# Patient Record
Sex: MALE | Race: WHITE | Employment: PART TIME | URBAN - METROPOLITAN AREA
[De-identification: names, ages, dates, MRNs, and addresses within clinical notes are randomized per-mention and may not be internally consistent; named-entity substitution may affect disease eponyms.]

---

## 2021-04-01 ENCOUNTER — IMMUNIZATION (OUTPATIENT)
Dept: FAMILY MEDICINE CLINIC | Age: 36
End: 2021-04-01
Payer: MEDICARE

## 2021-04-01 PROCEDURE — 91300 COVID-19, PFIZER VACCINE 30MCG/0.3ML DOSE: CPT | Performed by: FAMILY MEDICINE

## 2021-04-01 PROCEDURE — 0001A COVID-19, PFIZER VACCINE 30MCG/0.3ML DOSE: CPT | Performed by: FAMILY MEDICINE

## 2021-04-22 ENCOUNTER — IMMUNIZATION (OUTPATIENT)
Dept: FAMILY MEDICINE CLINIC | Age: 36
End: 2021-04-22
Payer: MEDICARE

## 2021-04-22 PROCEDURE — 0002A COVID-19, PFIZER VACCINE 30MCG/0.3ML DOSE: CPT | Performed by: FAMILY MEDICINE

## 2021-04-22 PROCEDURE — 91300 COVID-19, PFIZER VACCINE 30MCG/0.3ML DOSE: CPT | Performed by: FAMILY MEDICINE

## 2022-01-18 ENCOUNTER — HOSPITAL ENCOUNTER (EMERGENCY)
Age: 37
Discharge: HOME | End: 2022-01-18
Attending: EMERGENCY MEDICINE
Payer: MEDICARE

## 2022-01-18 ENCOUNTER — APPOINTMENT (OUTPATIENT)
Dept: GENERAL RADIOLOGY | Age: 37
End: 2022-01-18
Payer: MEDICARE

## 2022-01-18 VITALS
HEIGHT: 70 IN | RESPIRATION RATE: 19 BRPM | OXYGEN SATURATION: 96 % | HEART RATE: 95 BPM | TEMPERATURE: 97.9 F | SYSTOLIC BLOOD PRESSURE: 129 MMHG | DIASTOLIC BLOOD PRESSURE: 72 MMHG

## 2022-01-18 DIAGNOSIS — S82.891A CLOSED FRACTURE OF RIGHT ANKLE, INITIAL ENCOUNTER: Primary | ICD-10-CM

## 2022-01-18 PROCEDURE — 29515 APPLICATION SHORT LEG SPLINT: CPT

## 2022-01-18 PROCEDURE — 99283 EMERGENCY DEPT VISIT LOW MDM: CPT

## 2022-01-18 PROCEDURE — 73610 X-RAY EXAM OF ANKLE: CPT

## 2022-01-18 PROCEDURE — 6370000000 HC RX 637 (ALT 250 FOR IP): Performed by: EMERGENCY MEDICINE

## 2022-01-18 RX ORDER — HYDROCODONE BITARTRATE AND ACETAMINOPHEN 5; 325 MG/1; MG/1
1 TABLET ORAL EVERY 4 HOURS PRN
Qty: 18 TABLET | Refills: 0 | Status: SHIPPED | OUTPATIENT
Start: 2022-01-18 | End: 2022-01-21

## 2022-01-18 RX ORDER — ACETAMINOPHEN 500 MG
1000 TABLET ORAL ONCE
Status: COMPLETED | OUTPATIENT
Start: 2022-01-18 | End: 2022-01-18

## 2022-01-18 RX ADMIN — ACETAMINOPHEN 1000 MG: 500 TABLET ORAL at 01:15

## 2022-01-18 ASSESSMENT — PAIN DESCRIPTION - PAIN TYPE: TYPE: ACUTE PAIN

## 2022-01-18 ASSESSMENT — PAIN DESCRIPTION - LOCATION: LOCATION: ANKLE

## 2022-01-18 ASSESSMENT — PAIN DESCRIPTION - PROGRESSION: CLINICAL_PROGRESSION: GRADUALLY WORSENING

## 2022-01-18 ASSESSMENT — PAIN SCALES - GENERAL
PAINLEVEL_OUTOF10: 10
PAINLEVEL_OUTOF10: 10

## 2022-01-18 ASSESSMENT — PAIN DESCRIPTION - DESCRIPTORS: DESCRIPTORS: THROBBING

## 2022-01-18 ASSESSMENT — PAIN DESCRIPTION - FREQUENCY: FREQUENCY: CONTINUOUS

## 2022-01-18 ASSESSMENT — PAIN - FUNCTIONAL ASSESSMENT: PAIN_FUNCTIONAL_ASSESSMENT: PREVENTS OR INTERFERES SOME ACTIVE ACTIVITIES AND ADLS

## 2022-01-18 ASSESSMENT — PAIN DESCRIPTION - ORIENTATION: ORIENTATION: RIGHT

## 2022-01-18 NOTE — Clinical Note
Elizabet Wood was seen and treated in our emergency department on 1/18/2022. He may return to work on 01/24/2022. Restrictions as directed by the orthopedic doctor. If you have any questions or concerns, please don't hesitate to call.       Kevin John, DO

## 2022-01-18 NOTE — ED PROVIDER NOTES
[facility]      Pt Name: Vanesa Gonzalez  MRN: [id number]  [place] 1985  Date of evaluation: 1/18/2022  Provider: Karthik Wilson, [address] [place] [address]  Chief Complaint   Patient presents with    Ankle Pain     right ankle pain after fall       I wore personal protective equipment when I was in the room the entire time. This includes gloves, N95 mask, face shield, and a glove over my stethoscope for protection. HPI  Vanesa Gonzalez is a 43 y.o. male who presents with fall twisting his right ankle. He denies any paresthesias or weakness. He states he cannot ambulate and because it hurts too much. He denies any previous fractures. Movement makes it worse and rest makes it better. He is scribed as moderate. REVIEW OF SYSTEMS    All systems negative except as noted in the HPI. Reviewed Nurses' notes and concur. No LMP for male patient. PAST MEDICAL HISTORY  No past medical history on file. FAMILY HISTORY  No family history on file. SOCIAL HISTORY       SURGICAL HISTORY  No past surgical history on file. CURRENT MEDICATIONS      ALLERGIES  No Known Allergies    PHYSICAL EXAM  VITAL SIGNS: /72   Pulse 95   Temp 97.9 °F (36.6 °C) (Oral)   Resp 19   Ht 5' 10\" (1.778 m)   SpO2 96%   Constitutional: Well-developed, well-nourished, appears normal, nontoxic, activity: Resting comfortably in the cart, no obvious pain into his right ankle is palpated. Skin: Warm, Dry, No erythema, No rash. no Lacerations, n Abrasions. Back: No tenderness, Full range of motion, No scoliosis. Extremities:  neurovascular intact, obvious medial malleolar deformity, moderate swelling, no erythema, no lacerations noted, no amputations, no cyanosis, no mottling, no ecchymosis, severe tenderness of diffuse right ankle, capillary refill less than 2 seconds.   Musculoskeletal: Good range of motion in all other major joints except those described above. Neurologic: Alert & oriented x 3, Normal motor function, Normal sensory function, No focal deficits noted. Psychiatric: Anxious, Judgment normal, Mood normal, no confusion. LABORATORY  Labs Reviewed - No data to display      RADIOLOGY/PROCEDURES  I personally reviewed the images for this case. XR ANKLE RIGHT (MIN 3 VIEWS)   Final Result   1. Mildly comminuted spiral fracture of the mid-distal fibular diaphysis. There is mild anterior and lateral displacement and posterior angulation of   the major distal fracture fragment. 2.  There is widening of the medial ankle mortise consistent with ligamentous   injury. 3.  Small osseous fragments in the medial tibiotalar space without discrete   donor site, likely arising from the distal tibial metaphysis. 4.  Diffuse soft tissue swelling about the right ankle. COURSE & MEDICAL DECISION MAKING  Pertinent Imaging studies reviewed. (See chart for details)    Vitals:    01/18/22 0030 01/18/22 0100 01/18/22 0130 01/18/22 0145   BP:  129/62 134/88 129/72   Pulse: 99   95   Resp: 19      Temp: 97.9 °F (36.6 °C)      TempSrc: Oral      SpO2: 96% 95%  96%   Height: 5' 10\" (1.778 m)          Medications   acetaminophen (TYLENOL) tablet 1,000 mg (1,000 mg Oral Given 1/18/22 0115)       Discharge Medication List as of 1/18/2022  2:02 AM      START taking these medications    Details   HYDROcodone-acetaminophen (NORCO) 5-325 MG per tablet Take 1 tablet by mouth every 4 hours as needed for Pain for up to 3 days. Intended supply: 3 days. Take lowest dose possible to manage pain, Disp-18 tablet, R-0Print               SEP-1 CORE MEASURE DATA  Exclusion criteria: the patient is NOT to be included for sepsis due to: Infection is not suspected    Patient remained stable in the ED. x-ray revealed a fracture of the distal fibula and widening of the mortise on the medial malleolus.   This indicates a possible ligament disruption of the medial malleolus. I spoke to Dr. Prince Ruelas and he did not understand why I was calling him. Therefore, I splinted the patient and he was given crutches. He was instructed to follow with Dr. Prince Ruelas in the office and was given his referral numbers. He was instructed return if any problems. He was prescribed Vicodin for pain. The patient's blood pressure was not found to be elevated according to CMS/Medicare and the Affordable Care Act/[facility] criteria. See discharge instructions for specific medications, discharge information, and treatments. They were verbally instructed to return to emergency if any problems. (This chart has been completed using [address]. Although attempts have been made to ensure accuracy, words and/or phrases may not be transcribed as intended.)    Patient refused pain medicines at the time of their exam.    Tetanus vaccination status reviewed: tetanus re-vaccination not indicated. IMPRESSION(S):  1. Closed fracture of right ankle, initial encounter        ? Recheck Times: 0145      Diagnostic considerations include but are not limited to: Arterial Injury/Ischemia, Fracture, Dislocation, Infection, Compartment Syndrome, Neurologic Deficit/Injury.          Yeyo Elizondo DO  01/18/22 0059

## 2022-01-18 NOTE — ED NOTES
Provider order placed for patient's discharge. Provider reviewed decision to discharge with the patient. Discharge paperwork and any prescriptions were reviewed with the patient. Patient verbalized understanding of discharge education and any prescriptions and has no further questions or further needs at this time. Patient left with all personal belongings and was stable upon departure. Patient thanked for choosing [facility] ([facility]) and informed to return should any need arise.        Colleen Myrick RN  01/18/22 4689

## 2022-01-21 ENCOUNTER — ANESTHESIA EVENT (OUTPATIENT)
Dept: OPERATING ROOM | Age: 37
End: 2022-01-21
Payer: MEDICARE

## 2022-01-21 ENCOUNTER — OFFICE VISIT (OUTPATIENT)
Dept: ORTHOPEDIC SURGERY | Age: 37
End: 2022-01-21
Payer: MEDICARE

## 2022-01-21 ENCOUNTER — TELEPHONE (OUTPATIENT)
Dept: ORTHOPEDIC SURGERY | Age: 37
End: 2022-01-21

## 2022-01-21 ENCOUNTER — HOSPITAL ENCOUNTER (OUTPATIENT)
Age: 37
Discharge: HOME | End: 2022-01-21
Payer: MEDICARE

## 2022-01-21 VITALS — HEIGHT: 70 IN | RESPIRATION RATE: 16 BRPM | WEIGHT: 270 LBS | BODY MASS INDEX: 38.65 KG/M2

## 2022-01-21 DIAGNOSIS — G80.9 CEREBRAL PALSY, UNSPECIFIED TYPE (HCC): ICD-10-CM

## 2022-01-21 DIAGNOSIS — S93.431A ANKLE SYNDESMOSIS DISRUPTION, RIGHT, INITIAL ENCOUNTER: ICD-10-CM

## 2022-01-21 DIAGNOSIS — Z01.818 PREOP TESTING: Primary | ICD-10-CM

## 2022-01-21 DIAGNOSIS — S82.891A CLOSED FRACTURE DISLOCATION OF RIGHT ANKLE JOINT, INITIAL ENCOUNTER: ICD-10-CM

## 2022-01-21 PROCEDURE — 1036F TOBACCO NON-USER: CPT | Performed by: ORTHOPAEDIC SURGERY

## 2022-01-21 PROCEDURE — G8428 CUR MEDS NOT DOCUMENT: HCPCS | Performed by: ORTHOPAEDIC SURGERY

## 2022-01-21 PROCEDURE — 99204 OFFICE O/P NEW MOD 45 MIN: CPT | Performed by: ORTHOPAEDIC SURGERY

## 2022-01-21 PROCEDURE — U0005 INFEC AGEN DETEC AMPLI PROBE: HCPCS

## 2022-01-21 PROCEDURE — G8417 CALC BMI ABV UP PARAM F/U: HCPCS | Performed by: ORTHOPAEDIC SURGERY

## 2022-01-21 PROCEDURE — G8484 FLU IMMUNIZE NO ADMIN: HCPCS | Performed by: ORTHOPAEDIC SURGERY

## 2022-01-21 PROCEDURE — U0003 INFECTIOUS AGENT DETECTION BY NUCLEIC ACID (DNA OR RNA); SEVERE ACUTE RESPIRATORY SYNDROME CORONAVIRUS 2 (SARS-COV-2) (CORONAVIRUS DISEASE [COVID-19]), AMPLIFIED PROBE TECHNIQUE, MAKING USE OF HIGH THROUGHPUT TECHNOLOGIES AS DESCRIBED BY CMS-2020-01-R: HCPCS

## 2022-01-21 RX ORDER — PRAZOSIN HYDROCHLORIDE 2 MG/1
CAPSULE ORAL DAILY
COMMUNITY
Start: 2022-01-14

## 2022-01-21 RX ORDER — BENZTROPINE MESYLATE 1 MG/1
TABLET ORAL DAILY
COMMUNITY
Start: 2022-01-14

## 2022-01-21 RX ORDER — LOXAPINE SUCCINATE 10 MG/1
TABLET ORAL DAILY
COMMUNITY
Start: 2022-01-14

## 2022-01-21 RX ORDER — HYDROXYZINE PAMOATE 25 MG/1
CAPSULE ORAL DAILY
COMMUNITY
Start: 2022-01-14

## 2022-01-21 RX ORDER — FLUOXETINE HYDROCHLORIDE 40 MG/1
CAPSULE ORAL DAILY
COMMUNITY
Start: 2022-01-14

## 2022-01-21 RX ORDER — MELOXICAM 15 MG/1
TABLET ORAL DAILY
Status: ON HOLD | COMMUNITY
Start: 2021-11-19 | End: 2022-01-24 | Stop reason: HOSPADM

## 2022-01-21 RX ORDER — QUETIAPINE 400 MG/1
TABLET, FILM COATED, EXTENDED RELEASE ORAL DAILY
COMMUNITY
Start: 2022-01-14

## 2022-01-21 NOTE — TELEPHONE ENCOUNTER
General Question     Subject: AFTER SURGERY CARE  Patient and /or Facility Request: Christie Putty  Contact Number: [phone]    MOTHER CALLING REGARDING IF PATIENT CAN HAVE SOME AFTER SURGERY CARE, SUCH AS FEEDING AND BATHING. PATIENT IS CURRENTLY INDEPENDENT LIVING GROUP HOME. PLEASE CALL BACK BACK AT THE ABOVE NUMBER.

## 2022-01-21 NOTE — PROGRESS NOTES
ORTHOPAEDIC NEW PATIENT NOTE    Chief Complaint   Patient presents with    Ankle Injury     ankle injury        HPI  1/21/22  43 y.o. male seen for evaluation of right ankle fx: Onset 1/17/2022  Injury/trauma - tripped, twisted ankle  History of symptoms - hx of heel cord surgery when he was younger   He has cerebral palsy   Right hemiplegia (UE and LE affected)   He can WB thru RLE typically and ambulates   No sensory issues, however, does not have much motor function in the right leg/ankle/foot   He works at [employer] in [place]   He resides in a group home  Pain is located right ankle diffuse  Worse with pressure, ROM, WB  Better with splint/immobilization, elevation  Associated with swelling and bruising  No DM  Does not smoke  No hx of VTE      Review of Systems  I have read over the ROS from the Patient History Form dated on 1/21/22  Pertinent positives include wears glasses, HTN, peripheral edema, depression  Rest of 13 point ROS otherwise negative except per HPI, and scanned into the patient's chart under the Media tab. No Known Allergies     Current Outpatient Medications   Medication Sig Dispense Refill    benztropine (COGENTIN) 1 MG tablet daily      FLUoxetine (PROZAC) 40 MG capsule daily      hydrOXYzine (VISTARIL) 25 MG capsule daily      loxapine (LOXITANE) 10 MG capsule daily      meloxicam (MOBIC) 15 MG tablet daily      prazosin (MINIPRESS) 2 MG capsule daily Follow your MD/Surgeons pre-procedure instructions regarding your medications      QUEtiapine (SEROQUEL XR) 400 MG extended release tablet daily      HYDROcodone-acetaminophen (NORCO) 5-325 MG per tablet Take 1 tablet by mouth every 4 hours as needed for Pain for up to 3 days. Intended supply: 3 days. Take lowest dose possible to manage pain 18 tablet 0     No current facility-administered medications for this visit.        Past Medical History:   Diagnosis Date    Bipolar 1 disorder ([address]     Cerebral palsy ([address]     Depression     Hypertension     Mental deficiency     about level of 13 yr old    Schizophrenia ([facility]         Past Surgical History:   Procedure Laterality Date    FOOT SURGERY Right     heel chord extension X`s 2    HERNIA REPAIR      inguinal    TONSILLECTOMY         Family History   Problem Relation Age of Onset    High Blood Pressure Father     Heart Disease Father     High Cholesterol Father     Dementia Father        Social History     Socioeconomic History    Marital status: Single     Spouse name: Not on file    Number of children: Not on file    Years of education: Not on file    Highest education level: Not on file   Occupational History    Not on file   Tobacco Use    Smoking status: Never Smoker    Smokeless tobacco: Never Used   Vaping Use    Vaping Use: Never used   Substance and Sexual Activity    Alcohol use: Never    Drug use: Never    Sexual activity: Never   Other Topics Concern    Not on file   Social History Narrative    Not on file     Social Determinants of Health     Financial Resource Strain:     Difficulty of Paying Living Expenses: Not on file   Food Insecurity:     Worried About Running Out of Food in the Last Year: Not on file    Bobby of Food in the Last Year: Not on file   Transportation Needs:     Lack of Transportation (Medical): Not on file    Lack of Transportation (Non-Medical):  Not on file   Physical Activity:     Days of Exercise per Week: Not on file    Minutes of Exercise per Session: Not on file   Stress:     Feeling of Stress : Not on file   Social Connections:     Frequency of Communication with Friends and Family: Not on file    Frequency of Social Gatherings with Friends and Family: Not on file    Attends Bahai Services: Not on file    Active Member of Clubs or Organizations: Not on file    Attends Club or Organization Meetings: Not on file    Marital Status: Not on file   Intimate Partner Violence:     Fear of Current or Ex-Partner: Not on file    Emotionally Abused: Not on file    Physically Abused: Not on file    Sexually Abused: Not on file   Housing Stability:     Unable to Pay for Housing in the Last Year: Not on file    Number of Places Lived in the Last Year: Not on file    Unstable Housing in the Last Year: Not on file        Vitals:    01/21/22 0940   Resp: 16   Weight: 270 lb (122.5 kg)   Height: 5' 10\" (1.778 m)       Physical Exam  Constitutional - well-groomed, well-nourished, Body mass index is 38.74 kg/m². Here with a friend and his mother  Psychiatric - pleasant, normal mood & affect  Cardiovascular - RRR, positive peripheral edema, right dorsalis pedis pulse 2+  Respiratory - respirations unlabored, on room air; mask on  Skin - no rashes, wounds, or lesions seen on exposed skin  Neurological - RLE SILT SP/DP/T/sural/saphenous nerve distributions   Does not wiggle toes up and down, he reports baseline   Does not ankle dorsiflex or plantarflex, he reports baseline  Right foot/ankle - moderate to severe swelling   Diffuse ecchymosis   TTP medially and laterally   Overlying skin intact, no breakdown, no fracture blisters, no pressure ulcers   No TTP proximally in the hip/thigh/knee/leg      Imaging:  Images were personally reviewed by myself and discussed with the patient  Narrative   EXAMINATION:   THREE XRAY VIEWS OF THE RIGHT ANKLE       1/18/2022 12:35 am       COMPARISON:   None.       HISTORY:   ORDERING SYSTEM PROVIDED HISTORY: Fall, twisted, obvious   fracture-dislocation, no history of previous fractures. TECHNOLOGIST PROVIDED HISTORY:   Reason for exam:->Fall, twisted, obvious fracture-dislocation, no history of   previous fractures.    Reason for Exam: Fall, twisted, obvious fracture-dislocation, no history of   previous fractures.       FINDINGS:   There is a comminuted spiral fracture of the mid-distal fibular diaphysis   with mild anterior and lateral displacement of the major distal fracture fragment.  The major distal fracture fragment also demonstrates mild   posterior angulation.  There is asymmetric widening of the medial ankle   mortise and small osseous fragments in the medial tibiotalar joint space.  No   discrete donor site is visualized, although the fragments likely arise from   the distal tibial metaphysis. There is ossification of the right Achilles   tendon.  There are mild degenerative changes of the talonavicular joint. Small inferior and posterior calcaneal spurs.  Diffuse soft tissue swelling   about the right ankle.           Impression   1.  Mildly comminuted spiral fracture of the mid-distal fibular diaphysis. There is mild anterior and lateral displacement and posterior angulation of   the major distal fracture fragment.       2.  There is widening of the medial ankle mortise consistent with ligamentous   injury.       3.  Small osseous fragments in the medial tibiotalar space without discrete   donor site, likely arising from the distal tibial metaphysis.       4.  Diffuse soft tissue swelling about the right ankle. Assessment & Plan:  43 y.o. male who presents with    Diagnosis Orders   1. Preop testing  COVID-19   2. Closed fracture dislocation of right ankle joint, initial encounter     3. Ankle syndesmosis disruption, right, initial encounter     4. Cerebral palsy, unspecified type ([facility]         No orders of the defined types were placed in this encounter. Reviewed fracture and imaging with patient and his mother  Discussed treatment options, both conservative and surgical  I have recommended surgery in this case due to the displacement present.   Even 1 mm shift in talus can result in significantly altered/reduced contact areas (~42%), and therefore increased contact pressures  This can result in post-traumatic arthrosis  Goal of surgery would be to improve alignment, hopefully restore normal tibiotalar alignment/biomechanics, and hopefully delay the development of arthritis    Risks and benefits of right ankle fracture and syndesmosis operative fixation were discussed at length with the patient and his mother and an opportunity for questions was afforded. Possible complications of this surgery/fracture include, but are not limited to, non-union, malunion, persistent pain, post-traumatic arthritis, stiffness, infection, weakness, blood clots, bleeding, neurovascular injury, numbness around the surgical incision, hardware failure, periprosthetic fracture, painful hardware, and wound healing problems such as dehiscence. They demonstrated a good understanding of the procedure, anticipated outcomes, possible complications, the post-operative weight bearing restrictions and possible therapy required, and at this time voiced desire to proceed. Anticipate nonweightbearing for approximately 6 weeks postop. An informed consent form was signed and the patient will proceed with surgery. Strict elevation above heart level, ice therapy to aid in pain and swelling prior to surgery.     Rylie Stevenson MD

## 2022-01-21 NOTE — LETTER
Pre-Admission Testing Order Set   In accordance with our formulary system, a generic equivalent drug may be dispensed and administered unless a D.A. W. is written with the medication order  All orders without checkboxes will processed automatically unless crossed out. Orders with checkboxes MUST be checked in order to be carried out. Mimila Cruz                                                        1985  Date of Procedure/Surgery: right ankle fracture and syndesmosis open reduction internal fixation      1. H & P for ALL procedure/surgery completed within 30 days, to be updated day of procedure/surgery  2. [ ]Consults: PT/OT evaluation  3. [X ]Defer to Anesthesia for Pre-Admission testing orders  4. Diagnostic Tests:  [ ]EKG (if not completed in last 3 months) IF: (check all that apply)   Other:  [ Ebbie Mullet (if not completed in last 6 months) IF: (check all that apply)   Hx Malignancy   Hx CVS/Thoracic Surg   CVS Disease   Hx Pneumonia last 3 months   Chronic Pulm Disease  Other:  [ ]Radiology   Knee Right Left Standing AP knee, hip to ankle on scoliosis film   Other:  5. Labs  [ ]Basic Metabolic Profile  IF: (check all that apply)  [ ]Albumin    Other:     __________________________________________________________________  [ ]CBC without diff   Pre op exam      __________________________________________________________________  [ ]PT/INR  PTT   Pre op exam         __________________________________________________________________  [ ]Type & Screen   Surg with potiential for signif.  blood loss  [ ]Type & cross match 2 units         __________________________________________________________________  [ ]Urine routine reflex to culture (UAR) If cultures positive, repeat on                  admission [ ]Clean catch urine  [ ]Nasal culture for MRSA   [ ]Nasal MRSA culture  __________________________________________________________________  6. [ ]Other:      TO: ___________________________________________ Date: ____________ Time: _________    Physician Signature:            1/21/2022 11:05 AM                   Pre-operative Physician Prophylaxis Orders      Pritesh Alvarado  1985    All orders without checkboxes will be processed automatically unless crossed out. Orders with checkboxes MUST be checked in order to be carried out. 1. Allergies: Patient has no known allergies. 2. Procedure: 01/24/2022            Ht Readings from Last 1 Encounters:   01/21/22 5' 10\" (1.778 m)               Wt Readings from Last 1 Encounters:   01/21/22 270 lb (122.5 kg)     4. [ ] DVT Prophylaxis-Contraindication (Do not give)  Allergy to heparin Currently on anticoagulation  Not indicated, low risk patient  Thrombocytopenia Admitted for bleeding diagnosis Surgery or invasive procedure  [ ] Sequential Compression Boots to unaffected or bilateral extremities  [ ] Venous Compression Hose (GUILLERMO hose) to unaffected or bilateral extremities        Knee high  [ ] Heparin 5,000 units subcutaneously 1-2 hours pre op into the thigh opposite of operative site    5.   [ ] Beta Blocker  Patient to take beta blocker the morning of surgery with a sip of water as prescribed at home  Other:    6. [employer] ] Antimicrobial Prophylaxis (Consensus Guideline Recommendations) for       S.C.I. P. procedures  All antibiotics to be initiated 30 minutes pre-op (prior to leaving pre-op hold area/ACU) EXCEPT: Vancomycin and Ciprofloxacin. Initiate Vancomycin and Ciprofloxacin 2 hours pre-op. For combination antibiotic orders, start Ciprofloxacin first, then start second antibiotic ordered. In house patients, send pre-op antibiotics with patient to pre-op hold, do not initiate.     Surgical Procedure Routine pre-op Antibiotic  Penicillin or Cephalosporin Allergy  Orthopaedic  X Cefazolin (Ancef) 2 gm IVPB x1 X Clindamycin 900 mg IVPB x1    or     If > 80 kg Cefazolin 2 gm IVPB x1 Vancomycin 1 gm IVPB x1  Approved indications for Vancomycin:  MRSA related chronic wound dialysis  Other antibiotic to be given:    TO: _______________________________________________________ Date: ____________ Time: _______    Physician Signature:             Date: 1/21/2022  Time: 11:05 AM    Faxed to Pharmacy RN Signature: _________________________________ Date: _________ Time: _______

## 2022-01-21 NOTE — PROGRESS NOTES
C-Difficile admission screening and protocol:     * Admitted with diarrhea?no     *Prior history of C-Diff. In last 3 months?no     *Antibiotic use in the past 6-8 weeks?no     *Prior hospitalization or nursing home in the last month?    no    Preoperative Screening for Elective Surgery/Invasive Procedures While COVID-19 present in the community     1. Have you tested positive or have been told to self-isolate for COVID-19 like symptoms within the past 28 days?no  2. Do you currently have any of the following symptoms?no  ? Fever >100.0 F or 99.9 F in immunocompromised patients? ? New onset cough, shortness of breath or difficulty breathing? ? New onset sore throat, myalgia (muscle aches and pains), headache, loss of taste/smell or diarrhea? 3. Have you had a potential exposure to COVID-19 within the past 14 days by:no  ? Close contact with a confirmed case? ? Close contact with a healthcare worker,  or essential infrastructure worker (grocery store, [employer], gas station, public utilities or transportation)?no  ? Do you reside in a congregate setting such as; skilled nursing facility, adult home, correctional facility, homeless shelter or other institutional setting? Lives in [address] [place] [address]  ? Have you had recent travel to a known COVID-19 hotspot? Indicate if the patient has a positive screen by answering yes to one or more of the above questions. SAFETY FIRST. .call before you fall    Unfortunately due the rise in covid cases, staffing crisis and hospital capacity, your procedure may need to be rescheduled--if this were to happen your Surgeons office will notify you ASAP      [address]  time____0725________        Surgery time__0925__________    Take the following medications with a sip of water: Follow your MD/Surgeons pre-procedure instructions regarding your medications    Do not eat or drink anything after 12:00 midnight prior to your surgery. This includes water chewing gum, mints and ice chips. You may brush your teeth and gargle the morning of your surgery, but do not swallow the water     Please see your family doctor/pediatrician for a history and physical and/or concerning medications. Bring any test results/reports from your physicians office. If you are under the care of a heart doctor or specialist doctor, please be aware that you may be asked to them for clearance    You may be asked to stop blood thinners such as Coumadin, Plavix, Fragmin, Lovenox, etc., or any anti-inflammatories such as:  Aspirin, Ibuprofen, Advil, Naproxen prior to your surgery. We also ask that you stop any OTC medications such as fish oil, vitamin E, glucosamine, garlic, Multivitamins, COQ 10, etc.    We ask that you do not smoke 24 hours prior to surgery  We ask that you do not  drink any alcoholic beverages 24 hours prior to surgery     You must make arrangements for a responsible adult to take you home after your surgery. For your safety you will not be allowed to leave alone or drive yourself home. Your surgery will be cancelled if you do not have a ride home. Also for your safety, it is strongly suggested that someone stay with you the first 24 hours after your surgery. A parent or legal guardian must accompany a child scheduled for surgery and plan to stay at the hospital until the child is discharged. Please do not bring other children with you. For your comfort, please wear simple loose fitting clothing to the hospital.  Please do not bring valuables. Do not wear any make-up or nail polish on your fingers or toes      For your safety, please do not wear any jewelry or body piercing's on the day of surgery. All jewelry must be removed. If you have dentures, they will be removed before going to operating room. For your convenience, we will provide you with a container.     If you wear contact lenses or glasses, they

## 2022-01-21 NOTE — TELEPHONE ENCOUNTER
Called an told her that once the surgery is done to speak with the  tat the hospital and if this doesn't work to call us back and we will arrange home care for him. . told her that we need to wait for surgery to be done in order to place orders for after surgery.

## 2022-01-21 NOTE — LETTER
Dr Garcia Xiong [phone] F: [phone] [facility]  Surgery Scheduling Form:  DEMOGRAPHICS:                                                                                                                Patient Name:  David Garcia    Patient :  1985   Patient SS#:  [id number]      Patient Phone:  [phone] (home)      Patient Address:  [address] [place] [address] [address]    Comments: Insurance:  Medicare    DIAGNOSIS & PROCEDURE:                                                                                                Diagnosis: Right ankle fracture/subluxation S93.01    Operation:  right ankle fracture and syndesmosis open reduction internal fixation  [id number]    Location:  [facility]    Surgeon:  Igor Waters    SCHEDULING INFORMATION:                                                                                         .    Requested Date:  2022 OR Time: 9:25am  Patient Arrival Time: 7:25am     OR Time Required:  90  Minutes    Anesthesia:  General    SA Required:  Yes    Equipment:  Synthes. Arthrex syndesmosis tightrope.     Mini C-Arm:  No   Standard C-Arm:  Yes    Status:  outpatient PAT Required:  Yes  COVID19 test:  Today 2022    Latex Allergy:  no Defibrilator or Pacemaker:  no   Isolation Precautions:  no                         Luciana Bishop MD     22   BILLING INFORMATION:                                                                                                    .                              CPT Code Modifier  ORIF    Prior auth:

## 2022-01-21 NOTE — PROGRESS NOTES
Phone message left with Spike Bethea patient`s mom, [facility] told me to call mom and patient`s cell # no voice mail setup , to call PAT dept at [phone]  for history review and surgery instructions.

## 2022-01-21 NOTE — LETTER
[facility]  [address]  [address]  Phone: [phone]  Fax: [phone]    Ermias Juan MD        January 21, 2022     Patient: Kiko Melgar   YOB: 1985   Date of Visit: 1/21/2022       To Whom it May Concern:    Kiko Melgar was seen in my clinic on 1/21/2022. He will be out of work an estimate of 8 weeks. If you have any questions or concerns, please don't hesitate to call.     Sincerely,           Ermias Juan MD

## 2022-01-22 LAB — SARS-COV-2: NOT DETECTED

## 2022-01-24 ENCOUNTER — HOSPITAL ENCOUNTER (OUTPATIENT)
Age: 37
Setting detail: OUTPATIENT SURGERY
Discharge: HOME | End: 2022-01-24
Attending: ORTHOPAEDIC SURGERY | Admitting: ORTHOPAEDIC SURGERY
Payer: MEDICARE

## 2022-01-24 ENCOUNTER — ANESTHESIA (OUTPATIENT)
Dept: OPERATING ROOM | Age: 37
End: 2022-01-24
Payer: MEDICARE

## 2022-01-24 ENCOUNTER — APPOINTMENT (OUTPATIENT)
Dept: GENERAL RADIOLOGY | Age: 37
End: 2022-01-24
Attending: ORTHOPAEDIC SURGERY
Payer: MEDICARE

## 2022-01-24 ENCOUNTER — TELEPHONE (OUTPATIENT)
Dept: ORTHOPEDIC SURGERY | Age: 37
End: 2022-01-24

## 2022-01-24 VITALS
OXYGEN SATURATION: 95 % | DIASTOLIC BLOOD PRESSURE: 62 MMHG | TEMPERATURE: 98.6 F | RESPIRATION RATE: 3 BRPM | SYSTOLIC BLOOD PRESSURE: 131 MMHG

## 2022-01-24 VITALS
WEIGHT: 270 LBS | HEIGHT: 70 IN | HEART RATE: 90 BPM | BODY MASS INDEX: 38.65 KG/M2 | DIASTOLIC BLOOD PRESSURE: 84 MMHG | SYSTOLIC BLOOD PRESSURE: 147 MMHG | OXYGEN SATURATION: 93 % | TEMPERATURE: 96.8 F | RESPIRATION RATE: 16 BRPM

## 2022-01-24 DIAGNOSIS — S82.891A CLOSED FRACTURE DISLOCATION OF RIGHT ANKLE JOINT, INITIAL ENCOUNTER: Primary | ICD-10-CM

## 2022-01-24 PROBLEM — G80.9 CEREBRAL PALSY (HCC): Status: ACTIVE | Noted: 2022-01-24

## 2022-01-24 PROCEDURE — 7100000010 HC PHASE II RECOVERY - FIRST 15 MIN: Performed by: ORTHOPAEDIC SURGERY

## 2022-01-24 PROCEDURE — 27829 TREAT LOWER LEG JOINT: CPT | Performed by: ORTHOPAEDIC SURGERY

## 2022-01-24 PROCEDURE — 6360000002 HC RX W HCPCS

## 2022-01-24 PROCEDURE — 6360000002 HC RX W HCPCS: Performed by: ORTHOPAEDIC SURGERY

## 2022-01-24 PROCEDURE — 73600 X-RAY EXAM OF ANKLE: CPT

## 2022-01-24 PROCEDURE — 6360000002 HC RX W HCPCS: Performed by: NURSE ANESTHETIST, CERTIFIED REGISTERED

## 2022-01-24 PROCEDURE — 3600000014 HC SURGERY LEVEL 4 ADDTL 15MIN: Performed by: ORTHOPAEDIC SURGERY

## 2022-01-24 PROCEDURE — 3209999900 FLUORO FOR SURGICAL PROCEDURES

## 2022-01-24 PROCEDURE — 7100000001 HC PACU RECOVERY - ADDTL 15 MIN: Performed by: ORTHOPAEDIC SURGERY

## 2022-01-24 PROCEDURE — 2580000003 HC RX 258: Performed by: STUDENT IN AN ORGANIZED HEALTH CARE EDUCATION/TRAINING PROGRAM

## 2022-01-24 PROCEDURE — 3600000004 HC SURGERY LEVEL 4 BASE: Performed by: ORTHOPAEDIC SURGERY

## 2022-01-24 PROCEDURE — 7100000000 HC PACU RECOVERY - FIRST 15 MIN: Performed by: ORTHOPAEDIC SURGERY

## 2022-01-24 PROCEDURE — C1713 ANCHOR/SCREW BN/BN,TIS/BN: HCPCS | Performed by: ORTHOPAEDIC SURGERY

## 2022-01-24 PROCEDURE — 2500000003 HC RX 250 WO HCPCS

## 2022-01-24 PROCEDURE — 7100000011 HC PHASE II RECOVERY - ADDTL 15 MIN: Performed by: ORTHOPAEDIC SURGERY

## 2022-01-24 PROCEDURE — 6370000000 HC RX 637 (ALT 250 FOR IP): Performed by: ANESTHESIOLOGY

## 2022-01-24 PROCEDURE — 2709999900 HC NON-CHARGEABLE SUPPLY: Performed by: ORTHOPAEDIC SURGERY

## 2022-01-24 PROCEDURE — 2500000003 HC RX 250 WO HCPCS: Performed by: ORTHOPAEDIC SURGERY

## 2022-01-24 PROCEDURE — 2720000010 HC SURG SUPPLY STERILE: Performed by: ORTHOPAEDIC SURGERY

## 2022-01-24 PROCEDURE — 27822 TREATMENT OF ANKLE FRACTURE: CPT | Performed by: ORTHOPAEDIC SURGERY

## 2022-01-24 PROCEDURE — 3700000000 HC ANESTHESIA ATTENDED CARE: Performed by: ORTHOPAEDIC SURGERY

## 2022-01-24 PROCEDURE — 2580000003 HC RX 258: Performed by: ORTHOPAEDIC SURGERY

## 2022-01-24 PROCEDURE — A4217 STERILE WATER/SALINE, 500 ML: HCPCS | Performed by: ORTHOPAEDIC SURGERY

## 2022-01-24 PROCEDURE — 3700000001 HC ADD 15 MINUTES (ANESTHESIA): Performed by: ORTHOPAEDIC SURGERY

## 2022-01-24 DEVICE — SCREW BNE L14MM DIA3.5MM CORT S STL ST NONCANNULATED LOK: Type: IMPLANTABLE DEVICE | Site: ANKLE | Status: FUNCTIONAL

## 2022-01-24 DEVICE — SCREW CORTES 3.5MM SELF-TAPPING 18MM: Type: IMPLANTABLE DEVICE | Site: ANKLE | Status: FUNCTIONAL

## 2022-01-24 DEVICE — SCREW BNE L40MM DIA3.5MM CORT S STL ST NONCANNULATED LOK: Type: IMPLANTABLE DEVICE | Site: ANKLE | Status: FUNCTIONAL

## 2022-01-24 DEVICE — PLATE BNE L117MM 10 H S STL 1/3 TBLR LOK COMPR W/ CLLR FOR: Type: IMPLANTABLE DEVICE | Site: ANKLE | Status: FUNCTIONAL

## 2022-01-24 DEVICE — SYSTEM IMPL S STL KNOTLESS SYNDESMOSIS TIGHTROPE XP: Type: IMPLANTABLE DEVICE | Site: ANKLE | Status: FUNCTIONAL

## 2022-01-24 RX ORDER — ONDANSETRON 2 MG/ML
4 INJECTION INTRAMUSCULAR; INTRAVENOUS
Status: DISCONTINUED | OUTPATIENT
Start: 2022-01-24 | End: 2022-01-24 | Stop reason: HOSPADM

## 2022-01-24 RX ORDER — ESMOLOL HYDROCHLORIDE 10 MG/ML
INJECTION INTRAVENOUS PRN
Status: DISCONTINUED | OUTPATIENT
Start: 2022-01-24 | End: 2022-01-24 | Stop reason: SDUPTHER

## 2022-01-24 RX ORDER — LIDOCAINE HYDROCHLORIDE 20 MG/ML
INJECTION, SOLUTION EPIDURAL; INFILTRATION; INTRACAUDAL; PERINEURAL PRN
Status: DISCONTINUED | OUTPATIENT
Start: 2022-01-24 | End: 2022-01-24 | Stop reason: SDUPTHER

## 2022-01-24 RX ORDER — DEXAMETHASONE SODIUM PHOSPHATE 4 MG/ML
INJECTION, SOLUTION INTRA-ARTICULAR; INTRALESIONAL; INTRAMUSCULAR; INTRAVENOUS; SOFT TISSUE PRN
Status: DISCONTINUED | OUTPATIENT
Start: 2022-01-24 | End: 2022-01-24 | Stop reason: SDUPTHER

## 2022-01-24 RX ORDER — KETAMINE HCL IN NACL, ISO-OSM 100MG/10ML
SYRINGE (ML) INJECTION PRN
Status: DISCONTINUED | OUTPATIENT
Start: 2022-01-24 | End: 2022-01-24 | Stop reason: SDUPTHER

## 2022-01-24 RX ORDER — FENTANYL CITRATE 50 UG/ML
25 INJECTION, SOLUTION INTRAMUSCULAR; INTRAVENOUS EVERY 5 MIN PRN
Status: DISCONTINUED | OUTPATIENT
Start: 2022-01-24 | End: 2022-01-24 | Stop reason: HOSPADM

## 2022-01-24 RX ORDER — PROMETHAZINE HYDROCHLORIDE 25 MG/ML
6.25 INJECTION, SOLUTION INTRAMUSCULAR; INTRAVENOUS
Status: DISCONTINUED | OUTPATIENT
Start: 2022-01-24 | End: 2022-01-24 | Stop reason: HOSPADM

## 2022-01-24 RX ORDER — MAGNESIUM HYDROXIDE 1200 MG/15ML
LIQUID ORAL CONTINUOUS PRN
Status: COMPLETED | OUTPATIENT
Start: 2022-01-24 | End: 2022-01-24

## 2022-01-24 RX ORDER — PROPOFOL 10 MG/ML
INJECTION, EMULSION INTRAVENOUS PRN
Status: DISCONTINUED | OUTPATIENT
Start: 2022-01-24 | End: 2022-01-24 | Stop reason: SDUPTHER

## 2022-01-24 RX ORDER — MEPERIDINE HYDROCHLORIDE 25 MG/ML
12.5 INJECTION INTRAMUSCULAR; INTRAVENOUS; SUBCUTANEOUS EVERY 5 MIN PRN
Status: DISCONTINUED | OUTPATIENT
Start: 2022-01-24 | End: 2022-01-24 | Stop reason: HOSPADM

## 2022-01-24 RX ORDER — ONDANSETRON 2 MG/ML
INJECTION INTRAMUSCULAR; INTRAVENOUS PRN
Status: DISCONTINUED | OUTPATIENT
Start: 2022-01-24 | End: 2022-01-24 | Stop reason: SDUPTHER

## 2022-01-24 RX ORDER — GLYCOPYRROLATE 0.2 MG/ML
INJECTION INTRAMUSCULAR; INTRAVENOUS PRN
Status: DISCONTINUED | OUTPATIENT
Start: 2022-01-24 | End: 2022-01-24 | Stop reason: SDUPTHER

## 2022-01-24 RX ORDER — OXYCODONE HYDROCHLORIDE AND ACETAMINOPHEN 5; 325 MG/1; MG/1
1 TABLET ORAL
Status: COMPLETED | OUTPATIENT
Start: 2022-01-24 | End: 2022-01-24

## 2022-01-24 RX ORDER — SODIUM CHLORIDE 0.9 % (FLUSH) 0.9 %
5-40 SYRINGE (ML) INJECTION PRN
Status: DISCONTINUED | OUTPATIENT
Start: 2022-01-24 | End: 2022-01-24 | Stop reason: HOSPADM

## 2022-01-24 RX ORDER — OXYCODONE HYDROCHLORIDE AND ACETAMINOPHEN 5; 325 MG/1; MG/1
1 TABLET ORAL EVERY 6 HOURS PRN
Qty: 28 TABLET | Refills: 0 | Status: SHIPPED | OUTPATIENT
Start: 2022-01-24 | End: 2022-01-31

## 2022-01-24 RX ORDER — FENTANYL CITRATE 50 UG/ML
INJECTION, SOLUTION INTRAMUSCULAR; INTRAVENOUS PRN
Status: DISCONTINUED | OUTPATIENT
Start: 2022-01-24 | End: 2022-01-24 | Stop reason: SDUPTHER

## 2022-01-24 RX ORDER — SODIUM CHLORIDE 0.9 % (FLUSH) 0.9 %
5-40 SYRINGE (ML) INJECTION EVERY 12 HOURS SCHEDULED
Status: DISCONTINUED | OUTPATIENT
Start: 2022-01-24 | End: 2022-01-24 | Stop reason: HOSPADM

## 2022-01-24 RX ORDER — DIPHENHYDRAMINE HYDROCHLORIDE 50 MG/ML
12.5 INJECTION INTRAMUSCULAR; INTRAVENOUS
Status: DISCONTINUED | OUTPATIENT
Start: 2022-01-24 | End: 2022-01-24 | Stop reason: HOSPADM

## 2022-01-24 RX ORDER — SODIUM CHLORIDE 9 MG/ML
25 INJECTION, SOLUTION INTRAVENOUS PRN
Status: DISCONTINUED | OUTPATIENT
Start: 2022-01-24 | End: 2022-01-24 | Stop reason: HOSPADM

## 2022-01-24 RX ORDER — LABETALOL HYDROCHLORIDE 5 MG/ML
5 INJECTION, SOLUTION INTRAVENOUS EVERY 10 MIN PRN
Status: DISCONTINUED | OUTPATIENT
Start: 2022-01-24 | End: 2022-01-24 | Stop reason: HOSPADM

## 2022-01-24 RX ORDER — SODIUM CHLORIDE 9 MG/ML
INJECTION, SOLUTION INTRAVENOUS CONTINUOUS
Status: DISCONTINUED | OUTPATIENT
Start: 2022-01-24 | End: 2022-01-24 | Stop reason: HOSPADM

## 2022-01-24 RX ORDER — BUPIVACAINE HYDROCHLORIDE AND EPINEPHRINE 5; 5 MG/ML; UG/ML
INJECTION, SOLUTION EPIDURAL; INTRACAUDAL; PERINEURAL
Status: COMPLETED | OUTPATIENT
Start: 2022-01-24 | End: 2022-01-24

## 2022-01-24 RX ORDER — FENTANYL CITRATE 50 UG/ML
50 INJECTION, SOLUTION INTRAMUSCULAR; INTRAVENOUS EVERY 5 MIN PRN
Status: DISCONTINUED | OUTPATIENT
Start: 2022-01-24 | End: 2022-01-24 | Stop reason: HOSPADM

## 2022-01-24 RX ORDER — MIDAZOLAM HYDROCHLORIDE 1 MG/ML
INJECTION INTRAMUSCULAR; INTRAVENOUS PRN
Status: DISCONTINUED | OUTPATIENT
Start: 2022-01-24 | End: 2022-01-24 | Stop reason: SDUPTHER

## 2022-01-24 RX ADMIN — FENTANYL CITRATE 100 MCG: 50 INJECTION INTRAMUSCULAR; INTRAVENOUS at 11:16

## 2022-01-24 RX ADMIN — Medication 3000 MG: at 11:08

## 2022-01-24 RX ADMIN — SODIUM CHLORIDE: 9 INJECTION, SOLUTION INTRAVENOUS at 10:15

## 2022-01-24 RX ADMIN — ESMOLOL HYDROCHLORIDE 10 MG: 10 INJECTION, SOLUTION INTRAVENOUS at 11:58

## 2022-01-24 RX ADMIN — PROPOFOL 200 MG: 10 INJECTION, EMULSION INTRAVENOUS at 11:16

## 2022-01-24 RX ADMIN — ONDANSETRON 4 MG: 2 INJECTION INTRAMUSCULAR; INTRAVENOUS at 12:44

## 2022-01-24 RX ADMIN — PROPOFOL 50 MG: 10 INJECTION, EMULSION INTRAVENOUS at 11:36

## 2022-01-24 RX ADMIN — GLYCOPYRROLATE 0.1 MG: 0.2 INJECTION, SOLUTION INTRAMUSCULAR; INTRAVENOUS at 11:41

## 2022-01-24 RX ADMIN — MIDAZOLAM 2 MG: 1 INJECTION INTRAMUSCULAR; INTRAVENOUS at 11:07

## 2022-01-24 RX ADMIN — Medication 30 MG: at 11:41

## 2022-01-24 RX ADMIN — ESMOLOL HYDROCHLORIDE 10 MG: 10 INJECTION, SOLUTION INTRAVENOUS at 11:51

## 2022-01-24 RX ADMIN — SODIUM CHLORIDE: 9 INJECTION, SOLUTION INTRAVENOUS at 11:07

## 2022-01-24 RX ADMIN — OXYCODONE HYDROCHLORIDE AND ACETAMINOPHEN 1 TABLET: 5; 325 TABLET ORAL at 14:36

## 2022-01-24 RX ADMIN — HYDROMORPHONE HYDROCHLORIDE 1 MG: 1 INJECTION, SOLUTION INTRAMUSCULAR; INTRAVENOUS; SUBCUTANEOUS at 11:34

## 2022-01-24 RX ADMIN — LIDOCAINE HYDROCHLORIDE 100 MG: 20 INJECTION, SOLUTION EPIDURAL; INFILTRATION; INTRACAUDAL; PERINEURAL at 11:16

## 2022-01-24 RX ADMIN — DEXAMETHASONE SODIUM PHOSPHATE 8 MG: 4 INJECTION, SOLUTION INTRAMUSCULAR; INTRAVENOUS at 11:19

## 2022-01-24 RX ADMIN — ESMOLOL HYDROCHLORIDE 10 MG: 10 INJECTION, SOLUTION INTRAVENOUS at 11:44

## 2022-01-24 RX ADMIN — SODIUM CHLORIDE: 9 INJECTION, SOLUTION INTRAVENOUS at 10:45

## 2022-01-24 ASSESSMENT — PULMONARY FUNCTION TESTS
PIF_VALUE: 11
PIF_VALUE: 19
PIF_VALUE: 11
PIF_VALUE: 0
PIF_VALUE: 11
PIF_VALUE: 19
PIF_VALUE: 19
PIF_VALUE: 20
PIF_VALUE: 14
PIF_VALUE: 19
PIF_VALUE: 10
PIF_VALUE: 28
PIF_VALUE: 4
PIF_VALUE: 15
PIF_VALUE: 20
PIF_VALUE: 11
PIF_VALUE: 12
PIF_VALUE: 15
PIF_VALUE: 16
PIF_VALUE: 19
PIF_VALUE: 19
PIF_VALUE: 14
PIF_VALUE: 14
PIF_VALUE: 15
PIF_VALUE: 15
PIF_VALUE: 11
PIF_VALUE: 12
PIF_VALUE: 11
PIF_VALUE: 20
PIF_VALUE: 16
PIF_VALUE: 11
PIF_VALUE: 16
PIF_VALUE: 16
PIF_VALUE: 17
PIF_VALUE: 17
PIF_VALUE: 12
PIF_VALUE: 2
PIF_VALUE: 14
PIF_VALUE: 2
PIF_VALUE: 11
PIF_VALUE: 19
PIF_VALUE: 20
PIF_VALUE: 14
PIF_VALUE: 19
PIF_VALUE: 20
PIF_VALUE: 4
PIF_VALUE: 11
PIF_VALUE: 15
PIF_VALUE: 15
PIF_VALUE: 19
PIF_VALUE: 16
PIF_VALUE: 19
PIF_VALUE: 10
PIF_VALUE: 15
PIF_VALUE: 14
PIF_VALUE: 19
PIF_VALUE: 20
PIF_VALUE: 12
PIF_VALUE: 19
PIF_VALUE: 0
PIF_VALUE: 10
PIF_VALUE: 15
PIF_VALUE: 2
PIF_VALUE: 20
PIF_VALUE: 16
PIF_VALUE: 19
PIF_VALUE: 14
PIF_VALUE: 14
PIF_VALUE: 19
PIF_VALUE: 11
PIF_VALUE: 19
PIF_VALUE: 29
PIF_VALUE: 15
PIF_VALUE: 20
PIF_VALUE: 15
PIF_VALUE: 10
PIF_VALUE: 19
PIF_VALUE: 14
PIF_VALUE: 10
PIF_VALUE: 20
PIF_VALUE: 20
PIF_VALUE: 1
PIF_VALUE: 20
PIF_VALUE: 9
PIF_VALUE: 14
PIF_VALUE: 12
PIF_VALUE: 0
PIF_VALUE: 19
PIF_VALUE: 10
PIF_VALUE: 15
PIF_VALUE: 15
PIF_VALUE: 16
PIF_VALUE: 12
PIF_VALUE: 11
PIF_VALUE: 1
PIF_VALUE: 9
PIF_VALUE: 12
PIF_VALUE: 15
PIF_VALUE: 15
PIF_VALUE: 1
PIF_VALUE: 3
PIF_VALUE: 19
PIF_VALUE: 12
PIF_VALUE: 10
PIF_VALUE: 20
PIF_VALUE: 14
PIF_VALUE: 15
PIF_VALUE: 17
PIF_VALUE: 19
PIF_VALUE: 8
PIF_VALUE: 20
PIF_VALUE: 25
PIF_VALUE: 11
PIF_VALUE: 15
PIF_VALUE: 11
PIF_VALUE: 11
PIF_VALUE: 15
PIF_VALUE: 15
PIF_VALUE: 9
PIF_VALUE: 19

## 2022-01-24 ASSESSMENT — PAIN SCALES - GENERAL
PAINLEVEL_OUTOF10: 0
PAINLEVEL_OUTOF10: 5
PAINLEVEL_OUTOF10: 0
PAINLEVEL_OUTOF10: 5

## 2022-01-24 ASSESSMENT — PAIN DESCRIPTION - PAIN TYPE
TYPE: SURGICAL PAIN
TYPE: SURGICAL PAIN

## 2022-01-24 ASSESSMENT — PAIN DESCRIPTION - LOCATION
LOCATION: ANKLE
LOCATION: ANKLE

## 2022-01-24 ASSESSMENT — PAIN DESCRIPTION - DESCRIPTORS
DESCRIPTORS: ACHING
DESCRIPTORS: ACHING

## 2022-01-24 ASSESSMENT — PAIN DESCRIPTION - FREQUENCY
FREQUENCY: CONTINUOUS
FREQUENCY: CONTINUOUS

## 2022-01-24 ASSESSMENT — PAIN DESCRIPTION - PROGRESSION
CLINICAL_PROGRESSION: GRADUALLY WORSENING
CLINICAL_PROGRESSION: GRADUALLY WORSENING

## 2022-01-24 ASSESSMENT — PAIN DESCRIPTION - ONSET
ONSET: ON-GOING
ONSET: ON-GOING

## 2022-01-24 ASSESSMENT — PAIN DESCRIPTION - ORIENTATION
ORIENTATION: RIGHT
ORIENTATION: RIGHT

## 2022-01-24 ASSESSMENT — LIFESTYLE VARIABLES: SMOKING_STATUS: 0

## 2022-01-24 ASSESSMENT — ENCOUNTER SYMPTOMS: SHORTNESS OF BREATH: 0

## 2022-01-24 ASSESSMENT — PAIN - FUNCTIONAL ASSESSMENT
PAIN_FUNCTIONAL_ASSESSMENT: 0-10
PAIN_FUNCTIONAL_ASSESSMENT: PREVENTS OR INTERFERES SOME ACTIVE ACTIVITIES AND ADLS
PAIN_FUNCTIONAL_ASSESSMENT: PREVENTS OR INTERFERES SOME ACTIVE ACTIVITIES AND ADLS

## 2022-01-24 NOTE — ANESTHESIA PRE PROCEDURE
([facility]     Depression     Hypertension     Mental deficiency     about level of 13 yr old    Schizophrenia ([facility]        Past Surgical History:        Procedure Laterality Date    FOOT SURGERY Right     heel chord extension X`s 2    HERNIA REPAIR      inguinal    TONSILLECTOMY         Social History:    Social History     Tobacco Use    Smoking status: Never Smoker    Smokeless tobacco: Never Used   Substance Use Topics    Alcohol use: Never                                Counseling given: Not Answered      Vital Signs (Current):   Vitals:    01/21/22 1218 01/24/22 0802   BP:  (!) 136/92   Pulse:  96   Resp:  18   Temp:  97.1 °F (36.2 °C)   TempSrc:  Temporal   SpO2:  96%   Weight: 270 lb (122.5 kg) 270 lb (122.5 kg)   Height: 5' 10\" (1.778 m) 5' 10\" (1.778 m)                                              BP Readings from Last 3 Encounters:   01/24/22 (!) 136/92   01/18/22 129/72       NPO Status: Time of last liquid consumption: 2100                        Time of last solid consumption: 2100                        Date of last liquid consumption: 01/23/22                        Date of last solid food consumption: 01/23/22    BMI:   Wt Readings from Last 3 Encounters:   01/24/22 270 lb (122.5 kg)   01/21/22 270 lb (122.5 kg)     Body mass index is 38.74 kg/m². CBC: No results found for: WBC, RBC, HGB, HCT, MCV, RDW, PLT    CMP: No results found for: NA, K, CL, CO2, BUN, CREATININE, GFRAA, AGRATIO, LABGLOM, GLUCOSE, GLU, PROT, CALCIUM, BILITOT, ALKPHOS, AST, ALT    POC Tests: No results for input(s): POCGLU, POCNA, POCK, POCCL, POCBUN, POCHEMO, POCHCT in the last 72 hours. Coags: No results found for: PROTIME, INR, APTT    HCG (If Applicable): No results found for: PREGTESTUR, PREGSERUM, HCG, HCGQUANT     ABGs: No results found for: PHART, PO2ART, CUE0QZI, UCY6LPF, BEART, P9WZJSDL     Type & Screen (If Applicable):  No results found for: LABABO, LABRH    Drug/Infectious Status (If Applicable):   No results found for: HIV, HEPCAB    COVID-19 Screening (If Applicable):   Lab Results   Component Value Date    COVID19 Not Detected 01/21/2022           Anesthesia Evaluation  Patient summary reviewed no history of anesthetic complications:   Airway: Mallampati: III  TM distance: >3 FB   Neck ROM: full  Mouth opening: > = 3 FB Dental: normal exam         Pulmonary:Negative Pulmonary ROS       (-) shortness of breath and not a current smoker          Patient did not smoke on day of surgery. Cardiovascular:Negative CV ROS    (+) hypertension:,     (-) pacemaker, past MI, CABG/stent and  angina       Beta Blocker:  Not on Beta Blocker         Neuro/Psych:   Negative Neuro/Psych ROS  (+) psychiatric history: stable with treatment   (-) seizures and CVA           GI/Hepatic/Renal: Neg GI/Hepatic/Renal ROS       (-) liver disease and no renal disease       Endo/Other: Negative Endo/Other ROS       (-) diabetes mellitus, hypothyroidism, hyperthyroidism               Abdominal:   (+) obese,           Vascular: negative vascular ROS. Other Findings:             Anesthesia Plan      general     ASA 2     (This pre-anesthesia assessment may be used as a history and physical.    DOS STAFF ADDENDUM:    Pt seen and examined, chart reviewed (including anesthesia, drug and allergy history). No interval changes to history and physical examination. Anesthetic plan, risks, benefits, alternatives, and personnel involved discussed with patient. Patient verbalized an understanding and agrees to proceed. Carleen Del Valle MD  January 24, 2022  8:41 AM    )  Induction: intravenous. MIPS: Postoperative opioids intended and Prophylactic antiemetics administered. Anesthetic plan and risks discussed with patient. Plan discussed with CRNA.             This pre-anesthesia assessment may be used as a history and physical.    DOS STAFF ADDENDUM:    Pt seen and examined, chart reviewed (including anesthesia, drug and allergy history). No interval changes to history and physical examination. Anesthetic plan, risks, benefits, alternatives, and personnel involved discussed with patient. Patient verbalized an understanding and agrees to proceed.       Jamie Gonzalez MD  January 24, 2022  8:41 AM

## 2022-01-24 NOTE — PROGRESS NOTES
CLINICAL PHARMACY NOTE: MEDS TO BEDS    Total # of Prescriptions Filled: 1   The following medications were delivered to the patient:  Current Discharge Medication List      START taking these medications    Details   oxyCODONE-acetaminophen (PERCOCET) 5-325 MG per tablet Take 1 tablet by mouth every 6 hours as needed for Pain for up to 7 days.   Qty: 28 tablet, Refills: 0    Comments: Reduce doses taken as pain becomes manageable  Associated Diagnoses: Closed fracture dislocation of right ankle joint, initial encounter         ·   ·     Additional Documentation:

## 2022-01-24 NOTE — PROGRESS NOTES
Discharge instructions given to pt and family members, verbalized understanding. VSS. States pain is improving. Pt up and getting dressed, tolerating well. IV dc'd. Ready for discharge.

## 2022-01-24 NOTE — PROGRESS NOTES
Pt arrived to Phase 2 from the PACU alert and oriented. States pain is 5/10. VS at baseline. Pt tolerating oral intake. Family at bedside. Dressing dry and intact. Will continue to monitor.

## 2022-01-24 NOTE — PROGRESS NOTES
To pacu from OR. Pt asleep with oral airway in place. Dressing to right lower leg dry and intact. Pedal pulses palpable. IV infusing. Monitor in sinus rhythm.

## 2022-01-24 NOTE — TELEPHONE ENCOUNTER
CPT: 29123  BODY PART: right ankle  STATUS: outpatient  AUTHORIZATION: NPR    Medicare is primary, NPR

## 2022-01-24 NOTE — ANESTHESIA POSTPROCEDURE EVALUATION
[facility] Department of Anesthesiology  Post-Anesthesia Note       Name:  Blane Caruso                                  Age:  43 y.o. MRN:  [id number]     Last Vitals & Oxygen Saturation: BP (!) 147/84   Pulse 90   Temp 96.8 °F (36 °C) (Temporal)   Resp 16   Ht 5' 10\" (1.778 m)   Wt 270 lb (122.5 kg)   SpO2 93%   BMI 38.74 kg/m²   Patient Vitals for the past 4 hrs:   BP Temp Temp src Pulse Resp SpO2   01/24/22 1447 (!) 147/84   90 16 93 %   01/24/22 1420 (!) 156/102 96.8 °F (36 °C) Temporal 95 15 92 %   01/24/22 1415  97.3 °F (36.3 °C) Temporal 92 14 94 %   01/24/22 1403 (!) 176/90   101 16 92 %   01/24/22 1402    100 16 90 %   01/24/22 1347 (!) 159/111   96 19 92 %   01/24/22 1337 (!) 153/101   96 15 92 %   01/24/22 1332 (!) 149/99   97 14 92 %   01/24/22 1327 (!) 147/90   95 14 92 %   01/24/22 1322 138/84   93 13 94 %   01/24/22 1315 134/82 99.5 °F (37.5 °C) Temporal 94 13 92 %       Level of consciousness:  Awake, alert    Respiratory: Respirations easy, no distress. Stable. Cardiovascular: Hemodynamically stable. Hydration: Adequate. PONV: Adequately managed. Post-op pain: Adequately controlled. Post-op assessment: Tolerated anesthetic well without complication. Complications:  None.     Porfirio So MD  January 24, 2022   2:57 PM

## 2022-01-25 ENCOUNTER — TELEPHONE (OUTPATIENT)
Dept: ORTHOPEDIC SURGERY | Age: 37
End: 2022-01-25

## 2022-01-25 DIAGNOSIS — S82.891A CLOSED FRACTURE DISLOCATION OF RIGHT ANKLE JOINT, INITIAL ENCOUNTER: Primary | ICD-10-CM

## 2022-01-25 NOTE — OP NOTE
[address] [place] [address] [facility]                                OPERATIVE REPORT    PATIENT NAME: Kennedy Paula                    :        1985  MED REC NO:   [id number]                          ROOM:  ACCOUNT NO:   [de-identified]                           ADMIT DATE: 2022  PROVIDER:     Ashley Dominique MD      DATE OF PROCEDURE:  2022    PREOPERATIVE DIAGNOSES:  1. Closed right ankle fracture subluxation. 2.  Right syndesmosis disruption. 3.  Cerebral palsy, right hemiplegia. POSTOPERATIVE DIAGNOSES:  1. Closed right trimalleolar ankle fracture/subluxation. 2.  Right syndesmosis disruption. 3.  Cerebral palsy, right hemiplegia. OPERATION PERFORMED:  1. Open treatment of the right trimalleolar ankle fracture subluxation  with internal fixation of the lateral malleolus, without fixation of the  posterior lip. 2.  Open treatment of the right ankle syndesmosis disruption with  internal fixation. SURGEON:  Ashley Dominique MD    BLOOD LOSS:  Less than 100 mL. COMPLICATIONS:  None immediately apparent. OPERATIVE PROCEDURE:  The patient was brought back to the OR and  transferred onto the operating room table. General anesthesia was  administered. A nonsterile thigh tourniquet was applied and the entire  right lower extremity subsequently prepped and draped in the usual  sterile fashion. A full time-out was performed with all parties in  agreement. The patient did receive Ancef 3 gm for preoperative IV  antibiotics. The right lower extremity was exsanguinated with an  Esmarch and the tourniquet was then inflated to 300 mmHg. Given the patient's obesity with BMI of 38.74 kg/m2, the open treatment  of the trimalleolar ankle fracture/subluxation required approximately  50% more time.   Extra time was spent moving and positioning the patient,  obtaining adequate surgical exposure, obtaining fixation of the length  unstable comminuted fibular fracture, and layered wound closure. A direct distal fibular incision was made and carried down onto the  fracture site. The fracture was high above the syndesmosis and incision  did have to be extended proximally. The peroneal muscle was mobilized  and retracted posteriorly. This was severely comminuted fracture with a  long butterfly piece posteriorly. It was very narrow and no lag screws  were able to be utilized. Therefore, I had to reduce through the plate. I select the appropriate length one-third tubular plate, and the plate  was positioned appropriately from proximal to distal as well as anterior  to posterior. I first started by securing the plate proximally to the  proximal shaft and then reduced the distal fracture fragment to the  plate as well. Screw holes were sequentially filled. The fracture   comminution was bridged appropriately. The small medial malleolar   avulsion fragments did not require direct fixation. Under direct visualization and palpation, the distal fibula was reduced  into the incisura fibularis. The syndesmosis was very unstable and  using the most distal screw hole, the Arthrex syndesmosis TightRope  system was used. The guidewire was passed from posterolateral to  anteromedial trying to stay parallel to the joint line with syndesmosis  reduced. Once I checked the trajectory of the guidewire and was happy  with its position, the cannulated drill was used and the TightRope  device was then inserted across the distal fibula and tibia. The button  was flipped over the medial cortex and then the lateral button was  placed down onto the plate. The sutures were then sequentially  tightened for fixation of the syndesmosis. Fluoroscopy was used at this  time, which did again show some residual medial clear space widening and  therefore supplemental fixation of the syndesmosis was required.    Instead of using the adjacent screw hole, which had great bony purchase  with the cortical screw, I elected to use the next hole above it where  poor bony purchase was obtained with the screw there. Therefore, this  was drilled and a cortical screw was then inserted for additional  fixation of the syndesmosis. This screw did not pass the far cortex. Final fluoroscopic spot images were obtained and then saved. On the  lateral view, the posterior malleolar fracture fragment was well reduced  and did not require formal fixation. The lateral incision was thoroughly irrigated with normal saline and  then infiltrated with Marcaine. The deep tissue and the fascia was  closed with 0 Vicryl sutures to provide plate coverage. The tourniquet  was let down and hemostasis was confirmed. The skin was then closed  with inverted Vicryls and interrupted horizontal mattress 3-0 nylons. It was cleaned with wet and dries and then dressed in the usual sterile  fashion. The drapes were removed and a well-padded posterior splint was  applied. The patient was then transferred back onto the hospital  stretcher where he was extubated and moved to the PACU.       Denise Watkins MD    D: 01/24/2022 13:52:59       T: 01/24/2022 14:28:23     [email or web address]  Job#: [id number]     Doc#: [id number]    CC:

## 2022-01-25 NOTE — CARE COORDINATION
1/25/2022  Voicemail from surgery department and patient's mother ([phone]) from 1/24/2022 received today. Mother requesting assistance at home. Reviewed chart and noted Jennifer Ya Rn at [facility] office arranged home care. [facility] and confirmed above. SHe made referral to Dario Sheikh. She will also reach out to patient's mother.      Electronically signed by SONIA Thornton, ROSEMARY, Case Management on 1/25/2022 at 9:04 AM  [facility] [phone]

## 2022-01-27 ENCOUNTER — TELEPHONE (OUTPATIENT)
Dept: ORTHOPEDIC SURGERY | Age: 37
End: 2022-01-27

## 2022-02-02 ENCOUNTER — OFFICE VISIT (OUTPATIENT)
Dept: ORTHOPEDIC SURGERY | Age: 37
End: 2022-02-02
Payer: MEDICARE

## 2022-02-02 VITALS — BODY MASS INDEX: 38.65 KG/M2 | HEIGHT: 70 IN | WEIGHT: 270 LBS | RESPIRATION RATE: 16 BRPM

## 2022-02-02 DIAGNOSIS — G80.9 CEREBRAL PALSY, UNSPECIFIED TYPE (HCC): ICD-10-CM

## 2022-02-02 DIAGNOSIS — S82.891D: Primary | ICD-10-CM

## 2022-02-02 DIAGNOSIS — S93.431D ANKLE SYNDESMOSIS DISRUPTION, RIGHT, SUBSEQUENT ENCOUNTER: ICD-10-CM

## 2022-02-02 PROCEDURE — 29405 APPL SHORT LEG CAST: CPT | Performed by: ORTHOPAEDIC SURGERY

## 2022-02-02 PROCEDURE — 99024 POSTOP FOLLOW-UP VISIT: CPT | Performed by: ORTHOPAEDIC SURGERY

## 2022-02-02 NOTE — PROGRESS NOTES
ORTHOPAEDIC NEW PATIENT NOTE    Chief Complaint   Patient presents with    Post-Op Check     right ankle        HPI   2/2/22  Postop check right ankle  He reports the right ankle is overall doing well  He has been maintaining nonweightbearing of his right leg  Home therapy is coming to help him  He reports the posterior splint was rubbing against his posterior thigh when he was performing knee range of motion exercises      1/24/22  OPERATION PERFORMED:  1. Open treatment of the right trimalleolar ankle fracture subluxation with internal fixation of the lateral malleolus, without fixation of the posterior lip. 2.  Open treatment of the right ankle syndesmosis disruption with internal fixation. 1/21/22  43 y.o. male seen for evaluation of right ankle fx: Onset 1/17/2022  Injury/trauma - tripped, twisted ankle  History of symptoms - hx of heel cord surgery when he was younger   He has cerebral palsy   Right hemiplegia (UE and LE affected)   He can WB thru RLE typically and ambulates   No sensory issues, however, does not have much motor function in the right leg/ankle/foot   He works at [employer] in [place]   He resides in a group home  Pain is located right ankle diffuse  Worse with pressure, ROM, WB  Better with splint/immobilization, elevation  Associated with swelling and bruising  No DM  Does not smoke  No hx of VTE      No Known Allergies     Current Outpatient Medications   Medication Sig Dispense Refill    benztropine (COGENTIN) 1 MG tablet daily      FLUoxetine (PROZAC) 40 MG capsule daily      hydrOXYzine (VISTARIL) 25 MG capsule daily      loxapine (LOXITANE) 10 MG capsule daily      prazosin (MINIPRESS) 2 MG capsule daily Follow your MD/Surgeons pre-procedure instructions regarding your medications      QUEtiapine (SEROQUEL XR) 400 MG extended release tablet daily       No current facility-administered medications for this visit.        Past Medical History:   Diagnosis Date    Bipolar 1 disorder ([facility]     Cerebral palsy ([facility]     Depression     Hypertension     Mental deficiency     about level of 13 yr old    Schizophrenia [facility])         Past Surgical History:   Procedure Laterality Date    ANKLE FRACTURE SURGERY Right 1/24/2022    RIGHT ANKLE FRACTURE AND SYNDESMOSIS OPEN REDUCTION INTERNAL FIXATION performed by [facility] at [facility] [address] [place] [address] Right     heel chord extension X`s 2    HERNIA REPAIR      inguinal    TONSILLECTOMY         Family History   Problem Relation Age of Onset    High Blood Pressure Father     Heart Disease Father     High Cholesterol Father     Dementia Father        Social History     Socioeconomic History    Marital status: Single     Spouse name: Not on file    Number of children: Not on file    Years of education: Not on file    Highest education level: Not on file   Occupational History    Not on file   Tobacco Use    Smoking status: Never Smoker    Smokeless tobacco: Never Used   Vaping Use    Vaping Use: Never used   Substance and Sexual Activity    Alcohol use: Never    Drug use: Never    Sexual activity: Never   Other Topics Concern    Not on file   Social History Narrative    Not on file     Social Determinants of Health     Financial Resource Strain:     Difficulty of Paying Living Expenses: Not on file   Food Insecurity:     Worried About Running Out of Food in the Last Year: Not on file    Bobby of Food in the Last Year: Not on file   Transportation Needs:     Lack of Transportation (Medical): Not on file    Lack of Transportation (Non-Medical):  Not on file   Physical Activity:     Days of Exercise per Week: Not on file    Minutes of Exercise per Session: Not on file   Stress:     Feeling of Stress : Not on file   Social Connections:     Frequency of Communication with Friends and Family: Not on file    Frequency of Social Gatherings with Friends and Family: Not on file    Attends Orthodox Services: Not on file  Active Member of Clubs or Organizations: Not on file    Attends Club or Organization Meetings: Not on file    Marital Status: Not on file   Intimate Partner Violence:     Fear of Current or Ex-Partner: Not on file    Emotionally Abused: Not on file    Physically Abused: Not on file    Sexually Abused: Not on file   Housing Stability:     Unable to Pay for Housing in the Last Year: Not on file    Number of [place] in the Last Year: Not on file    Unstable Housing in the Last Year: Not on file        Vitals:    02/02/22 1039   Resp: 16   Weight: 270 lb (122.5 kg)   Height: 5' 10\" (1.778 m)       Physical Exam  Constitutional  well-groomed, well-nourished, Body mass index is 38.74 kg/m². Here with his mother  Psychiatric  pleasant, normal mood & affect  Cardiovascular  RRR, positive peripheral edema, right dorsalis pedis pulse 2+  Respiratory  respirations unlabored, on room air; mask on  Neurological - RLE SILT SP/DP/T/sural/saphenous nerve distributions   Does not wiggle toes up and down, he reports baseline   Does not ankle dorsiflex or plantarflex, he reports baseline  Right foot/ankle - moderate swelling   Resolved ecchymosis   Lateral surgical incision well healed, no erythema or drainage    Sutures removed, Steri-Strips applied    Imaging:  Images were personally reviewed by myself and discussed with the patient  Right ankle 3 views performed today in clinic - status post open reduction internal fixation of the high lateral malleolus/fibula comminuted fracture. The small medial malleolar avulsion fractures are again seen. Syndesmosis fixation with cortical screw and tightrope device. Appropriate tibiofibular overlap is seen on the AP and mortise views. There is evidence of some residual medial clear space widening seen. The nondisplaced posterior malleolus fracture fragment is not visualized. Assessment & Plan:  43 y.o. male who presents with    Diagnosis Orders   1.  Closed fracture dislocation of right ankle joint, with routine healing, subsequent encounter     2. Ankle syndesmosis disruption, right, subsequent encounter  XR ANKLE RIGHT (MIN 3 VIEWS)   3. Cerebral palsy, unspecified type ([facility]         No orders of the defined types were placed in this encounter.       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

## 2022-03-14 ENCOUNTER — OFFICE VISIT (OUTPATIENT)
Dept: ORTHOPEDIC SURGERY | Age: 37
End: 2022-03-14
Payer: MEDICARE

## 2022-03-14 VITALS — WEIGHT: 270 LBS | HEIGHT: 70 IN | RESPIRATION RATE: 16 BRPM | BODY MASS INDEX: 38.65 KG/M2

## 2022-03-14 DIAGNOSIS — S82.891D: ICD-10-CM

## 2022-03-14 DIAGNOSIS — Z01.818 PREOP TESTING: ICD-10-CM

## 2022-03-14 DIAGNOSIS — G80.9 CEREBRAL PALSY, UNSPECIFIED TYPE (HCC): ICD-10-CM

## 2022-03-14 DIAGNOSIS — S93.431D ANKLE SYNDESMOSIS DISRUPTION, RIGHT, SUBSEQUENT ENCOUNTER: Primary | ICD-10-CM

## 2022-03-14 PROCEDURE — 99024 POSTOP FOLLOW-UP VISIT: CPT | Performed by: ORTHOPAEDIC SURGERY

## 2022-03-14 PROCEDURE — L4360 PNEUMAT WALKING BOOT PRE CST: HCPCS | Performed by: ORTHOPAEDIC SURGERY

## 2022-03-14 NOTE — PROGRESS NOTES
ORTHOPAEDIC NEW PATIENT NOTE    Chief Complaint   Patient presents with    Post-Op Check     right ankle       HPI   3/14/22  Second postop check right ankle  Reports his right ankle is doing well  He denies pain  Tolerated the cast pretty well, does complain of some itchiness however  No numbness or tingling      2/2/22  Postop check right ankle  He reports the right ankle is overall doing well  He has been maintaining nonweightbearing of his right leg  Home therapy is coming to help him  He reports the posterior splint was rubbing against his posterior thigh when he was performing knee range of motion exercises      1/24/22  OPERATION PERFORMED:  1. Open treatment of the right trimalleolar ankle fracture subluxation with internal fixation of the lateral malleolus, without fixation of the posterior lip. 2.  Open treatment of the right ankle syndesmosis disruption with internal fixation. 1/21/22  43 y.o. male seen for evaluation of right ankle fx:   Onset 1/17/2022  Injury/trauma - tripped, twisted ankle  History of symptoms - hx of heel cord surgery when he was younger   He has cerebral palsy   Right hemiplegia (UE and LE affected)   He can WB thru RLE typically and ambulates   No sensory issues, however, does not have much motor function in the right leg/ankle/foot   He works at [employer] in [place]   He resides in a group home  Pain is located right ankle diffuse  Worse with pressure, ROM, WB  Better with splint/immobilization, elevation  Associated with swelling and bruising  No DM  Does not smoke  No hx of VTE      No Known Allergies     Current Outpatient Medications   Medication Sig Dispense Refill    benztropine (COGENTIN) 1 MG tablet daily      FLUoxetine (PROZAC) 40 MG capsule daily      hydrOXYzine (VISTARIL) 25 MG capsule daily      loxapine (LOXITANE) 10 MG capsule daily      prazosin (MINIPRESS) 2 MG capsule daily Follow your MD/Surgeons pre-procedure instructions regarding your medications      QUEtiapine (SEROQUEL XR) 400 MG extended release tablet daily       No current facility-administered medications for this visit. Past Medical History:   Diagnosis Date    [facility] [address] [facility]     Cerebral palsy ([facility] [address] [facility]     Depression     Hypertension     Mental deficiency     about level of 13 yr old    Schizophrenia [facility])         Past Surgical History:   Procedure Laterality Date    ANKLE FRACTURE SURGERY Right 1/24/2022    RIGHT ANKLE FRACTURE AND SYNDESMOSIS OPEN REDUCTION INTERNAL FIXATION performed by Meme Huynh MD at [address] [place] [address] Rd Right     heel chord extension X`s 2    HERNIA REPAIR      inguinal    TONSILLECTOMY         Family History   Problem Relation Age of Onset    High Blood Pressure Father     Heart Disease Father     High Cholesterol Father     Dementia Father        Social History     Socioeconomic History    Marital status: Single     Spouse name: Not on file    Number of children: Not on file    Years of education: Not on file    Highest education level: Not on file   Occupational History    Not on file   Tobacco Use    Smoking status: Never Smoker    Smokeless tobacco: Never Used   Vaping Use    Vaping Use: Never used   Substance and Sexual Activity    Alcohol use: Never    Drug use: Never    Sexual activity: Never   Other Topics Concern    Not on file   Social History Narrative    Not on file     Social Determinants of Health     Financial Resource Strain:     Difficulty of Paying Living Expenses: Not on file   Food Insecurity:     Worried About Running Out of Food in the Last Year: Not on file    Bobby of Food in the Last Year: Not on file   Transportation Needs:     Lack of Transportation (Medical): Not on file    Lack of Transportation (Non-Medical):  Not on file   Physical Activity:     Days of Exercise per Week: Not on file    Minutes of Exercise per Session: Not on file   Stress:     Feeling of Stress : Not on file Social Connections:     Frequency of Communication with Friends and Family: Not on file    Frequency of Social Gatherings with Friends and Family: Not on file    Attends Bahai Services: Not on file    Active Member of Clubs or Organizations: Not on file    Attends Club or Organization Meetings: Not on file    Marital Status: Not on file   Intimate Partner Violence:     Fear of Current or Ex-Partner: Not on file    Emotionally Abused: Not on file    Physically Abused: Not on file    Sexually Abused: Not on file   Housing Stability:     Unable to Pay for Housing in the Last Year: Not on file    Number of [place] in the Last Year: Not on file    Unstable Housing in the Last Year: Not on file        Vitals:    03/14/22 1310   Resp: 16   Weight: 270 lb (122.5 kg)   Height: 5' 10\" (1.778 m)       Physical Exam  Constitutional - well-groomed, well-nourished, Body mass index is 38.74 kg/m². Here with his mother  Psychiatric - pleasant, normal mood & affect  Cardiovascular - RRR, positive peripheral edema, right dorsalis pedis pulse 2+  Respiratory - respirations unlabored, on room air; mask on  Neurological - RLE SILT SP/DP/T/sural/saphenous nerve distributions   Does not wiggle toes up and down, he reports baseline   Does not ankle dorsiflex or plantarflex, he reports baseline  Right foot/ankle - mild swelling   Resolved ecchymosis   Xeroderma, no skin breakdown   Lateral surgical incision well healed, no erythema or drainage      Imaging:  Images were personally reviewed by myself and discussed with the patient  Right ankle 3 views performed 3/14/22 in clinic - status post open reduction internal fixation of the high lateral malleolus/fibula comminuted fracture. The small medial malleolar avulsion fractures are again seen. Syndesmosis fixation with cortical screw and tightrope device. Appropriate tibiofibular overlap is seen on the AP and mortise views. Mortise is overall intact.   The nondisplaced posterior malleolus fracture fragment is not visualized. Assessment & Plan:  43 y.o. male who presents with    Diagnosis Orders   1. Ankle syndesmosis disruption, right, subsequent encounter  XR ANKLE RIGHT (MIN 3 VIEWS)    Breg / Airselect Tall Walking Boot   2. Closed fracture dislocation of right ankle joint, with routine healing, subsequent encounter  XR ANKLE RIGHT (MIN 3 VIEWS)    Breg / Airselect Tall Walking Boot   3. Cerebral palsy, unspecified type ([address]     4. Preop testing  COVID-19           Procedures    Breg / Airselect Tall Walking Boot     Patient was prescribed a Tall Walking Boot. The right ankle will require stabilization / immobilization from this semi-rigid / rigid orthosis to improve their function. The orthosis will assist in protecting the affected area, provide functional support and facilitate healing. Patient was instructed to progress ambulation  as non weight bearing in the device. The plan of care is to progress the patient to full weight bearing status. The patient was educated and fit by a healthcare professional with expert knowledge and specialization in brace application while under the direct supervision of the physician. Verbal and written instructions for the use of and application of this item were provided. They were instructed to contact the office immediately should the brace result in increased pain, decreased sensation, increased swelling or worsening of the condition.      He is doing well  We will get him set up for syndesmosis screw removal  Possibly supplement syndesmosis fixation with another Tightrope if needed  Discussed details of surgery, risk and benefits with the patient and his mother  They would like to proceed  Right ankle removal of hardware, possible supplemental syndesmosis fixation    Outpatient surgery    Boot fitted today  NWB until syndesmosis screw removed  While at rest, remove boot three times a day for minimum 15 minutes each time to monitor and prevent pressure ulcers/sores. Encourage ankle ROM exercises when out of boot.       Abisai Mckeon MD

## 2022-03-14 NOTE — LETTER
Dr Dee Dee Aponte [phone] F: [phone]  Surgery Scheduling Form:  DEMOGRAPHICS:                                                                                                                  Patient Name:  More Mejia    Patient :  1985   Patient SS#:  [id number]      Patient Phone:  [phone] (home)      Patient Address:  [address] [place] [address]    Comments:     Insurance:      DIAGNOSIS & PROCEDURE:                                                                                               Diagnosis:   S/p right ankle and syndesmosis ORIF    Operation:  Right ankle removal of hardware, possible supplemental syndesmosis fixation    [facility]:  [facility]    Surgeon:  Familia Hill    SCHEDULING INFORMATION:                                                                                         .    Requested Date:  Tuesday 3/22/22  OR Time: 2:00pm  Patient Arrival Time: 12:00pm     OR Time Required:  45  Minutes    Anesthesia:  General    SA Required:  Yes    Equipment:   Mini C-arm  Arthrex syndesmosis tightrope available  synthes small frag set available    Status:  outpatient PAT Required:  Yes  COVID19 test:  Within 5 days of surgry    Latex Allergy:  no Defibrilator or Pacemaker:  no    Isolation Precautions:  no                         Elaine Sequeira MD     3/14/22   BILLING INFORMATION:                                                                                                    .                                    CPT Code Modifier  Removal of hardware    Prior auth:  [id number]

## 2022-03-16 ENCOUNTER — TELEPHONE (OUTPATIENT)
Dept: ORTHOPEDIC SURGERY | Age: 37
End: 2022-03-16

## 2022-03-16 NOTE — TELEPHONE ENCOUNTER
CPT: 98853  BODY PART: right ankle  STATUS: outpatient  AUTHORIZATION: NPR    Submitted online with [facility], [address]

## 2022-03-18 NOTE — PROGRESS NOTES
Name_______________________________________Printed:____________________  Date and time of surgery__3/22/2022_________1345_____________Arrival Time:______1200__________   1. The instructions given regarding when and if a patient needs to stop oral intake prior to surgery varies. Follow the specific instructions you were given                  _x_Nothing to eat or to drink after Midnight the night before.                   ____Carbo loading or ERAS instructions will be given to select patients-if you have been given those instructions -please do the following                           The evening before your surgery after dinner before midnight drink 40 ounces of gatorade. If you are diabetic use sugar free. The morning of surgery drink 40 ounces of water. This needs to be finished 3 hours prior to your surgery start time. 2. Take the following pills with a small sip of water on the morning of surgery__vistaril, prozac, minipress,cogentin_,loxapine, seroquel________________________________________________                  Do not take blood pressure medications ending in pril or sartan the valerie prior to surgery or the morning of surgery_   3. Aspirin, Ibuprofen, Advil, Naproxen, Vitamin E and other Anti-inflammatory products and supplements should be stopped for 5 -7days before surgery or as directed by your physician. 4. Check with your Doctor regarding stopping Plavix, Coumadin,Eliquis, Lovenox,Effient,Pradaxa,Xarelto, Fragmin or other blood thinners and follow their instructions. 5. Do not smoke, and do not drink any alcoholic beverages 24 hours prior to surgery. This includes NA Beer. Refrain from the usage of any recreational drugs. 6. You may brush your teeth and gargle the morning of surgery. DO NOT SWALLOW WATER   7. You MUST make arrangements for a responsible adult to stay on site while you are here and take you home after your surgery. You will not be allowed to leave alone or drive yourself home.   It is strongly suggested someone stay with you the first 24 hrs. Your surgery will be cancelled if you do not have a ride home. 8. A parent/legal guardian must accompany a child scheduled for surgery and plan to stay at the hospital until the child is discharged. Please do not bring other children with you. 9. Please wear simple, loose fitting clothing to the hospital.  Yamil Estes not bring valuables (money, credit cards, checkbooks, etc.) Do not wear any makeup (including no eye makeup) or nail polish on your fingers or toes. 10. DO NOT wear any jewelry or piercings on day of surgery. All body piercing jewelry must be removed. 11. If you have ___dentures, they will be removed before going to the OR; we will provide you a container. If you wear ___contact lenses or ___glasses, they will be removed; please bring a case for them. 12. Please see your family doctor/pediatrician for a history & physical and/or concerning medications. Bring any test results/reports from your physician's office. PCP__________________Phone___________H&P Appt. Date________             13 If you  have a Living Will and Durable Power of  for Healthcare, please bring in a copy. 15. Notify your Surgeon if you develop any illness between now and surgery  time, cough, cold, fever, sore throat, nausea, vomiting, etc.  Please notify your surgeon if you experience dizziness, shortness of breath or blurred vision between now & the time of your surgery             15. DO NOT shave your operative site 96 hours prior to surgery. For face & neck surgery, men may use an electric razor 48 hours prior to surgery. 16. Shower the night before or morning of surgery using an antibacterial soap or as you have been instructed. 17. To provide excellent care visitors will be limited to one in the room at any given time. 18.  Please bring picture ID and insurance card. 19.  Visit our web site for additional information:  [email or web address]              20.During flu season no children under the age of 15 are permitted in the hospital for the safety of all patients. 21. If you take a long acting insulin in the evening only  take half of your usual  dose the night  before your procedure              22. If you use a c-pap please bring DOS if staying overnight,             23.For your convenience [facility] has a pharmacy on site to fill your prescriptions. 24. If you use oxygen and have a portable tank please bring it  with you the DOS             25. Bring a complete list of all your medications with name and dose include any supplements. 26. Other__________________________________________   *Please call pre admission testing if you any further questions   Juanito Medina   [address]    [phone]  [phone]   [facility]    ___ Covid test to be done 3-5 days prior to scheduled surgery -patient aware they are REQUIRED to bring a copy of the negative result DOS-if they receive a positive result to notify their surgeon         If known - indicate where patient is getting covid test done ___________________________________________________________    ___ Rapid - DOS    ___ Other___________________________________      Lory [place] POLICY(subject to change)    There is a one visitor policy at [facility] for all surgeries and endoscopies. Whether the visitor can stay or will be asked to wait in the car will depend on the current policy and if social distancing can be maintained. The policy is subject to change at any time. Please make sure the visitor has a cell phone that is on,charged and able to accept calls, as this may be the way that the staff communicates with them. Pain management is NO VISITOR policyThe patients ride is expected to remain in the car with a cell phone for communication. If the ride is leaving the hospital grounds please make sure they are back in time for pickup. Have the patient inform the staff on arrival what their rides plans are while the patient is in the facility. At the MAIN there is one visitor allowed. Please note that the visitor policy is subject to change. All above information reviewed with patient in person or by phone. Patient verbalizes understanding. All questions and concerns addressed.                                                                                                  Patient/Rep__mother__________________                                                                                                                                    PRE OP INSTRUCTIONS

## 2022-03-19 ENCOUNTER — HOSPITAL ENCOUNTER (OUTPATIENT)
Age: 37
Discharge: HOME | End: 2022-03-19
Payer: MEDICARE

## 2022-03-19 DIAGNOSIS — Z01.818 PREOP TESTING: ICD-10-CM

## 2022-03-19 PROCEDURE — U0003 INFECTIOUS AGENT DETECTION BY NUCLEIC ACID (DNA OR RNA); SEVERE ACUTE RESPIRATORY SYNDROME CORONAVIRUS 2 (SARS-COV-2) (CORONAVIRUS DISEASE [COVID-19]), AMPLIFIED PROBE TECHNIQUE, MAKING USE OF HIGH THROUGHPUT TECHNOLOGIES AS DESCRIBED BY CMS-2020-01-R: HCPCS

## 2022-03-19 PROCEDURE — U0005 INFEC AGEN DETEC AMPLI PROBE: HCPCS

## 2022-03-20 LAB — SARS-COV-2: NOT DETECTED

## 2022-03-22 ENCOUNTER — ANESTHESIA (OUTPATIENT)
Dept: OPERATING ROOM | Age: 37
End: 2022-03-22
Payer: MEDICARE

## 2022-03-22 ENCOUNTER — ANESTHESIA EVENT (OUTPATIENT)
Dept: OPERATING ROOM | Age: 37
End: 2022-03-22
Payer: MEDICARE

## 2022-03-22 ENCOUNTER — APPOINTMENT (OUTPATIENT)
Dept: GENERAL RADIOLOGY | Age: 37
End: 2022-03-22
Attending: ORTHOPAEDIC SURGERY
Payer: MEDICARE

## 2022-03-22 ENCOUNTER — HOSPITAL ENCOUNTER (OUTPATIENT)
Age: 37
Setting detail: OUTPATIENT SURGERY
Discharge: HOME | End: 2022-03-22
Attending: ORTHOPAEDIC SURGERY | Admitting: ORTHOPAEDIC SURGERY
Payer: MEDICARE

## 2022-03-22 VITALS
BODY MASS INDEX: 38.6 KG/M2 | OXYGEN SATURATION: 95 % | RESPIRATION RATE: 21 BRPM | SYSTOLIC BLOOD PRESSURE: 143 MMHG | HEART RATE: 93 BPM | DIASTOLIC BLOOD PRESSURE: 94 MMHG | WEIGHT: 269 LBS | TEMPERATURE: 97 F

## 2022-03-22 VITALS
RESPIRATION RATE: 6 BRPM | DIASTOLIC BLOOD PRESSURE: 58 MMHG | SYSTOLIC BLOOD PRESSURE: 133 MMHG | TEMPERATURE: 96.4 F | OXYGEN SATURATION: 97 %

## 2022-03-22 DIAGNOSIS — Z96.9 RETAINED ORTHOPEDIC HARDWARE: Primary | ICD-10-CM

## 2022-03-22 DIAGNOSIS — S93.431A ANKLE SYNDESMOSIS DISRUPTION, RIGHT, INITIAL ENCOUNTER: ICD-10-CM

## 2022-03-22 PROCEDURE — 20680 REMOVAL OF IMPLANT DEEP: CPT | Performed by: ORTHOPAEDIC SURGERY

## 2022-03-22 PROCEDURE — 2500000003 HC RX 250 WO HCPCS: Performed by: NURSE ANESTHETIST, CERTIFIED REGISTERED

## 2022-03-22 PROCEDURE — 7100000001 HC PACU RECOVERY - ADDTL 15 MIN: Performed by: ORTHOPAEDIC SURGERY

## 2022-03-22 PROCEDURE — 2580000003 HC RX 258: Performed by: ANESTHESIOLOGY

## 2022-03-22 PROCEDURE — 7100000000 HC PACU RECOVERY - FIRST 15 MIN: Performed by: ORTHOPAEDIC SURGERY

## 2022-03-22 PROCEDURE — 3600000014 HC SURGERY LEVEL 4 ADDTL 15MIN: Performed by: ORTHOPAEDIC SURGERY

## 2022-03-22 PROCEDURE — 2580000003 HC RX 258: Performed by: ORTHOPAEDIC SURGERY

## 2022-03-22 PROCEDURE — 3600000004 HC SURGERY LEVEL 4 BASE: Performed by: ORTHOPAEDIC SURGERY

## 2022-03-22 PROCEDURE — 6360000002 HC RX W HCPCS: Performed by: NURSE ANESTHETIST, CERTIFIED REGISTERED

## 2022-03-22 PROCEDURE — 2709999900 HC NON-CHARGEABLE SUPPLY: Performed by: ORTHOPAEDIC SURGERY

## 2022-03-22 PROCEDURE — 6360000002 HC RX W HCPCS: Performed by: ORTHOPAEDIC SURGERY

## 2022-03-22 PROCEDURE — 2500000003 HC RX 250 WO HCPCS: Performed by: ORTHOPAEDIC SURGERY

## 2022-03-22 PROCEDURE — 7100000011 HC PHASE II RECOVERY - ADDTL 15 MIN: Performed by: ORTHOPAEDIC SURGERY

## 2022-03-22 PROCEDURE — 3700000001 HC ADD 15 MINUTES (ANESTHESIA): Performed by: ORTHOPAEDIC SURGERY

## 2022-03-22 PROCEDURE — 3700000000 HC ANESTHESIA ATTENDED CARE: Performed by: ORTHOPAEDIC SURGERY

## 2022-03-22 PROCEDURE — 7100000010 HC PHASE II RECOVERY - FIRST 15 MIN: Performed by: ORTHOPAEDIC SURGERY

## 2022-03-22 PROCEDURE — 73600 X-RAY EXAM OF ANKLE: CPT

## 2022-03-22 RX ORDER — ONDANSETRON 2 MG/ML
4 INJECTION INTRAMUSCULAR; INTRAVENOUS
Status: DISCONTINUED | OUTPATIENT
Start: 2022-03-22 | End: 2022-03-22 | Stop reason: HOSPADM

## 2022-03-22 RX ORDER — PROPOFOL 10 MG/ML
INJECTION, EMULSION INTRAVENOUS PRN
Status: DISCONTINUED | OUTPATIENT
Start: 2022-03-22 | End: 2022-03-22 | Stop reason: SDUPTHER

## 2022-03-22 RX ORDER — HYDROCODONE BITARTRATE AND ACETAMINOPHEN 5; 325 MG/1; MG/1
1 TABLET ORAL EVERY 8 HOURS PRN
Qty: 9 TABLET | Refills: 0 | Status: SHIPPED | OUTPATIENT
Start: 2022-03-22 | End: 2022-03-22 | Stop reason: SDUPTHER

## 2022-03-22 RX ORDER — OXYCODONE HYDROCHLORIDE 5 MG/1
5 TABLET ORAL
Status: DISCONTINUED | OUTPATIENT
Start: 2022-03-22 | End: 2022-03-22 | Stop reason: HOSPADM

## 2022-03-22 RX ORDER — SODIUM CHLORIDE, SODIUM LACTATE, POTASSIUM CHLORIDE, CALCIUM CHLORIDE 600; 310; 30; 20 MG/100ML; MG/100ML; MG/100ML; MG/100ML
INJECTION, SOLUTION INTRAVENOUS CONTINUOUS
Status: DISCONTINUED | OUTPATIENT
Start: 2022-03-22 | End: 2022-03-22 | Stop reason: HOSPADM

## 2022-03-22 RX ORDER — DEXAMETHASONE SODIUM PHOSPHATE 4 MG/ML
INJECTION, SOLUTION INTRA-ARTICULAR; INTRALESIONAL; INTRAMUSCULAR; INTRAVENOUS; SOFT TISSUE PRN
Status: DISCONTINUED | OUTPATIENT
Start: 2022-03-22 | End: 2022-03-22 | Stop reason: SDUPTHER

## 2022-03-22 RX ORDER — LIDOCAINE HYDROCHLORIDE 10 MG/ML
1 INJECTION, SOLUTION EPIDURAL; INFILTRATION; INTRACAUDAL; PERINEURAL
Status: DISCONTINUED | OUTPATIENT
Start: 2022-03-22 | End: 2022-03-22 | Stop reason: HOSPADM

## 2022-03-22 RX ORDER — MEPERIDINE HYDROCHLORIDE 25 MG/ML
12.5 INJECTION INTRAMUSCULAR; INTRAVENOUS; SUBCUTANEOUS EVERY 5 MIN PRN
Status: DISCONTINUED | OUTPATIENT
Start: 2022-03-22 | End: 2022-03-22 | Stop reason: HOSPADM

## 2022-03-22 RX ORDER — HYDROMORPHONE HCL 110MG/55ML
0.5 PATIENT CONTROLLED ANALGESIA SYRINGE INTRAVENOUS EVERY 5 MIN PRN
Status: DISCONTINUED | OUTPATIENT
Start: 2022-03-22 | End: 2022-03-22 | Stop reason: HOSPADM

## 2022-03-22 RX ORDER — HYDRALAZINE HYDROCHLORIDE 20 MG/ML
10 INJECTION INTRAMUSCULAR; INTRAVENOUS
Status: DISCONTINUED | OUTPATIENT
Start: 2022-03-22 | End: 2022-03-22 | Stop reason: HOSPADM

## 2022-03-22 RX ORDER — LABETALOL HYDROCHLORIDE 5 MG/ML
10 INJECTION, SOLUTION INTRAVENOUS
Status: DISCONTINUED | OUTPATIENT
Start: 2022-03-22 | End: 2022-03-22 | Stop reason: HOSPADM

## 2022-03-22 RX ORDER — MIDAZOLAM HYDROCHLORIDE 1 MG/ML
INJECTION INTRAMUSCULAR; INTRAVENOUS PRN
Status: DISCONTINUED | OUTPATIENT
Start: 2022-03-22 | End: 2022-03-22 | Stop reason: SDUPTHER

## 2022-03-22 RX ORDER — BUPIVACAINE HYDROCHLORIDE 5 MG/ML
INJECTION, SOLUTION EPIDURAL; INTRACAUDAL
Status: COMPLETED | OUTPATIENT
Start: 2022-03-22 | End: 2022-03-22

## 2022-03-22 RX ORDER — HYDROCODONE BITARTRATE AND ACETAMINOPHEN 5; 325 MG/1; MG/1
1 TABLET ORAL EVERY 8 HOURS PRN
Qty: 9 TABLET | Refills: 0 | Status: SHIPPED | OUTPATIENT
Start: 2022-03-22 | End: 2022-03-25

## 2022-03-22 RX ORDER — ONDANSETRON 2 MG/ML
INJECTION INTRAMUSCULAR; INTRAVENOUS PRN
Status: DISCONTINUED | OUTPATIENT
Start: 2022-03-22 | End: 2022-03-22 | Stop reason: SDUPTHER

## 2022-03-22 RX ORDER — LIDOCAINE HYDROCHLORIDE 20 MG/ML
INJECTION, SOLUTION EPIDURAL; INFILTRATION; INTRACAUDAL; PERINEURAL PRN
Status: DISCONTINUED | OUTPATIENT
Start: 2022-03-22 | End: 2022-03-22 | Stop reason: SDUPTHER

## 2022-03-22 RX ORDER — FENTANYL CITRATE 50 UG/ML
INJECTION, SOLUTION INTRAMUSCULAR; INTRAVENOUS PRN
Status: DISCONTINUED | OUTPATIENT
Start: 2022-03-22 | End: 2022-03-22 | Stop reason: SDUPTHER

## 2022-03-22 RX ADMIN — FENTANYL CITRATE 50 MCG: 50 INJECTION, SOLUTION INTRAMUSCULAR; INTRAVENOUS at 13:56

## 2022-03-22 RX ADMIN — ONDANSETRON 4 MG: 2 INJECTION INTRAMUSCULAR; INTRAVENOUS at 13:59

## 2022-03-22 RX ADMIN — MIDAZOLAM 2 MG: 1 INJECTION INTRAMUSCULAR; INTRAVENOUS at 13:50

## 2022-03-22 RX ADMIN — PROPOFOL 250 MG: 10 INJECTION, EMULSION INTRAVENOUS at 13:55

## 2022-03-22 RX ADMIN — SODIUM CHLORIDE, POTASSIUM CHLORIDE, SODIUM LACTATE AND CALCIUM CHLORIDE: 600; 310; 30; 20 INJECTION, SOLUTION INTRAVENOUS at 12:25

## 2022-03-22 RX ADMIN — FENTANYL CITRATE 50 MCG: 50 INJECTION, SOLUTION INTRAMUSCULAR; INTRAVENOUS at 14:08

## 2022-03-22 RX ADMIN — LIDOCAINE HYDROCHLORIDE 100 MG: 20 INJECTION, SOLUTION EPIDURAL; INFILTRATION; INTRACAUDAL; PERINEURAL at 13:55

## 2022-03-22 RX ADMIN — CEFAZOLIN SODIUM 3000 MG: 10 INJECTION, POWDER, FOR SOLUTION INTRAVENOUS at 13:46

## 2022-03-22 RX ADMIN — DEXAMETHASONE SODIUM PHOSPHATE 8 MG: 4 INJECTION, SOLUTION INTRAMUSCULAR; INTRAVENOUS at 13:59

## 2022-03-22 ASSESSMENT — PULMONARY FUNCTION TESTS
PIF_VALUE: 1
PIF_VALUE: 6
PIF_VALUE: 5
PIF_VALUE: 2
PIF_VALUE: 6
PIF_VALUE: 0
PIF_VALUE: 4
PIF_VALUE: 10
PIF_VALUE: 1
PIF_VALUE: 2
PIF_VALUE: 2
PIF_VALUE: 4
PIF_VALUE: 4
PIF_VALUE: 11
PIF_VALUE: 5
PIF_VALUE: 6
PIF_VALUE: 17
PIF_VALUE: 5
PIF_VALUE: 5
PIF_VALUE: 16
PIF_VALUE: 2
PIF_VALUE: 6
PIF_VALUE: 3
PIF_VALUE: 4
PIF_VALUE: 6
PIF_VALUE: 2
PIF_VALUE: 0
PIF_VALUE: 7
PIF_VALUE: 4
PIF_VALUE: 4
PIF_VALUE: 5
PIF_VALUE: 10
PIF_VALUE: 12
PIF_VALUE: 3
PIF_VALUE: 4
PIF_VALUE: 6
PIF_VALUE: 2

## 2022-03-22 ASSESSMENT — LIFESTYLE VARIABLES: SMOKING_STATUS: 0

## 2022-03-22 ASSESSMENT — PAIN - FUNCTIONAL ASSESSMENT: PAIN_FUNCTIONAL_ASSESSMENT: 0-10

## 2022-03-22 NOTE — ANESTHESIA POSTPROCEDURE EVALUATION
Department of Anesthesiology  Postprocedure Note    Patient: Aria Perales  MRN: [id number]  YOB: 1985  Date of evaluation: 3/22/2022  Time:  2:40 PM     Procedure Summary     Date: 03/22/22 Room / Location: [address]    Anesthesia Start: 1350 Anesthesia Stop: 1409    Procedure: RIGHT ANKLE REMOVAL OF HARDWARE, (Right Ankle) Diagnosis:       (S93.431D S/P RIGHT ANKLE AND SYNDESMOSIS)      (S82.891D OPEN REDUCTION INTERNAL FIXATION)    Surgeons: Sam Pickens MD Responsible Provider: Daljit King MD    Anesthesia Type: general ASA Status: 3          Anesthesia Type: general    Mildred Phase I: Mildred Score: 5    Mildred Phase II:      Last vitals: Reviewed and per EMR flowsheets.        Anesthesia Post Evaluation    Patient location during evaluation: PACU  Patient participation: complete - patient participated  Level of consciousness: awake and alert  Airway patency: patent  Nausea & Vomiting: no vomiting and no nausea  Complications: no  Cardiovascular status: hemodynamically stable  Respiratory status: acceptable  Hydration status: stable

## 2022-03-22 NOTE — H&P
PLANNED PROCEDURE:  Right ankle removal of hardware, possible supplemental syndesmosis fixation    I have reviewed my notes and patient's imaging, examined the patient, and no pertinent changes are required.      Past Medical History:   Diagnosis Date    B[facility] 1 disorder ([facility] [address]     Cerebral palsy ([facility] [address] [facility]     Depression     Hypertension     Mental deficiency     about level of 13 yr old    Schizophrenia ([facility] [address]        Past Surgical History:   Procedure Laterality Date    ANKLE FRACTURE SURGERY Right 1/24/2022    RIGHT ANKLE FRACTURE AND SYNDESMOSIS OPEN REDUCTION INTERNAL FIXATION performed by Ermias Juan MD at [facility] Right     heel chord extension X`s 2    HERNIA REPAIR      inguinal    TONSILLECTOMY         Social History     Tobacco Use    Smoking status: Never Smoker    Smokeless tobacco: Never Used   Vaping Use    Vaping Use: Never used   Substance Use Topics    Alcohol use: Never    Drug use: Never       No Known Allergies    Medications Prior to Admission: benztropine (COGENTIN) 1 MG tablet, daily  FLUoxetine (PROZAC) 40 MG capsule, daily  hydrOXYzine (VISTARIL) 25 MG capsule, daily  loxapine (LOXITANE) 10 MG capsule, daily  prazosin (MINIPRESS) 2 MG capsule, daily Follow your MD/Surgeons pre-procedure instructions regarding your medications  QUEtiapine (SEROQUEL XR) 400 MG extended release tablet, daily      Current Facility-Administered Medications:     ceFAZolin (ANCEF) 3000 mg in dextrose 5 % 100 mL IVPB, 3,000 mg, IntraVENous, On Call to OR, Ermias Juan MD    meperidine (DEMEROL) injection 12.5 mg, 12.5 mg, IntraVENous, Q5 Min PRN, Shawna Lowe MD    HYDROmorphone (DILAUDID) injection 0.5 mg, 0.5 mg, IntraVENous, Q5 Min PRN, Shawna Lowe MD    oxyCODONE (ROXICODONE) immediate release tablet 5 mg, 5 mg, Oral, Once PRN, Shawna Lowe MD    ondansetron [facility]) injection 4 mg, 4 mg, IntraVENous, Once PRN, Shawna Lowe MD    labetalol (NORMODYNE;TRANDATE) injection 10 mg, 10 mg, IntraVENous, Q15 Min PRN **OR** hydrALAZINE (APRESOLINE) injection 10 mg, 10 mg, IntraVENous, Q15 Min PRN, Rachel Barragan MD    lidocaine PF 1 % injection 1 mL, 1 mL, IntraDERmal, Once PRN, Rachel Barragan MD    lactated ringers infusion, , IntraVENous, Continuous, Rachel Barragan MD    Vitals:    03/22/22 1202   BP: (!) 150/79   Pulse: 97   Resp: 18   Temp: 97.2 °F (36.2 °C)   TempSrc: Temporal   SpO2: 95%   Weight: 269 lb (122 kg)       Body mass index is 38.6 kg/m². Constitutional  well-groomed, well-nourished  Psychiatric  pleasant, normal mood & affect  Cardiovascular  RRR, positive peripheral edema, right dorsalis pedis pulse 2+  Respiratory  respirations unlabored, on room air; mask on  Neurological - RLE SILT SP/DP/T/sural/saphenous nerve distributions              Does not wiggle toes up and down, he reports baseline              Does not ankle dorsiflex or plantarflex, he reports baseline  Right foot/ankle - mild swelling              Resolved ecchymosis              Xeroderma, no skin breakdown              Lateral surgical incision well healed, no erythema or drainage    An opportunity for questions was again given, and all questions were answered. The patient would like to proceed.     Kelley Boyer MD  3/22/2022

## 2022-03-22 NOTE — ANESTHESIA PRE PROCEDURE
Department of Anesthesiology  Preprocedure Note       Name:  Suma Hodgson   Age:  43 y.o.  :  1985                                          MRN:  [id number]         Date:  3/22/2022      Surgeon: Cristina Yung):  Tierra Muñiz MD    Procedure: Procedure(s):  RIGHT ANKLE REMOVAL OF HARDWARE, POSSIBLE SUPPLEMENTAL SYNDESMOSIS FIXATION-ARTHREX    Medications prior to admission:   Prior to Admission medications    Medication Sig Start Date End Date Taking?  Authorizing Provider   benztropine (COGENTIN) 1 MG tablet daily 22   Historical Provider, MD   FLUoxetine (PROZAC) 40 MG capsule daily 22   Historical Provider, MD   hydrOXYzine (VISTARIL) 25 MG capsule daily 22   Historical Provider, MD   loxapine Nery [place]) 10 MG capsule daily 22   Historical Provider, MD   prazosin (MINIPRESS) 2 MG capsule daily Follow your MD/Surgeons pre-procedure instructions regarding your medications 22   Historical Provider, MD   QUEtiapine (SEROQUEL XR) 400 MG extended release tablet daily 22   Historical Provider, MD       Current medications:    Current Facility-Administered Medications   Medication Dose Route Frequency Provider Last Rate Last Admin    meperidine (DEMEROL) injection 12.5 mg  12.5 mg IntraVENous Q5 Min PRN Micaela Montana MD        HYDROmorphone (DILAUDID) injection 0.5 mg  0.5 mg IntraVENous Q5 Min PRN Micaela Montana MD        oxyCODONE (ROXICODONE) immediate release tablet 5 mg  5 mg Oral Once PRN Micaela Montana MD        ondansetron [facility]) injection 4 mg  4 mg IntraVENous Once PRN Micaela Montana MD        labetalol (NORMODYNE;TRANDATE) injection 10 mg  10 mg IntraVENous Q15 Min PRN Micaela Montana MD        Or    hydrALAZINE (APRESOLINE) injection 10 mg  10 mg IntraVENous Q15 Min PRN Micaela Montana MD           Allergies:  No Known Allergies    Problem List:    Patient Active Problem List   Diagnosis Code    Closed fracture dislocation of right ankle joint, initial encounter S82.891A    Cerebral palsy (HCC) G80.9    Adult BMI 38.0-38.9 kg/sq m Z68.38    Ankle syndesmosis disruption, right, initial encounter S93.431A       Past Medical History:        Diagnosis Date    [facility] ([facility]     Cerebral palsy ([facility]     Depression     Hypertension     Mental deficiency     about level of 13 yr old    Schizophrenia ([facility]        Past Surgical History:        Procedure Laterality Date    ANKLE FRACTURE SURGERY Right 1/24/2022    RIGHT ANKLE FRACTURE AND SYNDESMOSIS OPEN REDUCTION INTERNAL FIXATION performed by Clark Ortega MD at [address] [place] [address] Rd Right     heel chord extension X`s 2    HERNIA REPAIR      inguinal    TONSILLECTOMY         Social History:    Social History     Tobacco Use    Smoking status: Never Smoker    Smokeless tobacco: Never Used   Substance Use Topics    Alcohol use: Never                                Counseling given: Not Answered      Vital Signs (Current):   Vitals:    03/22/22 1202   BP: (!) 150/79   Pulse: 97   Resp: 18   Temp: 97.2 °F (36.2 °C)   TempSrc: Temporal   SpO2: 95%   Weight: 269 lb (122 kg)                                              BP Readings from Last 3 Encounters:   03/22/22 (!) 150/79   01/24/22 131/62   01/24/22 (!) 147/84       NPO Status:                                                                                 BMI:   Wt Readings from Last 3 Encounters:   03/22/22 269 lb (122 kg)   03/14/22 270 lb (122.5 kg)   02/02/22 270 lb (122.5 kg)     Body mass index is 38.6 kg/m². CBC: No results found for: WBC, RBC, HGB, HCT, MCV, RDW, PLT    CMP: No results found for: NA, K, CL, CO2, BUN, CREATININE, GFRAA, AGRATIO, LABGLOM, GLUCOSE, GLU, PROT, CALCIUM, BILITOT, ALKPHOS, AST, ALT    POC Tests: No results for input(s): POCGLU, POCNA, POCK, POCCL, POCBUN, POCHEMO, POCHCT in the last 72 hours.     Coags: No results found for: PROTIME, INR, APTT    HCG (If Applicable): No results found for: PREGTESTUR, PREGSERUM, HCG, HCGQUANT     ABGs: No results found for: PHART, PO2ART, BKV8FFI, YJI0ESK, BEART, S0PLQPVH     Type & Screen (If Applicable):  No results found for: LABABO, LABRH    Drug/Infectious Status (If Applicable):  No results found for: HIV, HEPCAB    COVID-19 Screening (If Applicable):   Lab Results   Component Value Date    COVID19 Not Detected 03/19/2022           Anesthesia Evaluation  Patient summary reviewed and Nursing notes reviewed no history of anesthetic complications:   Airway: Mallampati: III  TM distance: >3 FB   Neck ROM: full  Mouth opening: > = 3 FB Dental: normal exam         Pulmonary:Negative Pulmonary ROS and normal exam  breath sounds clear to auscultation      (-) COPD, asthma, sleep apnea and not a current smoker                           Cardiovascular:    (+) hypertension:,     (-) past MI, CAD, CABG/stent, dysrhythmias,  angina and  CHF      Rhythm: regular  Rate: normal                    Neuro/Psych:   (+) neuromuscular disease (CP):, psychiatric history (Bipolar, Schizophrenia, MRDD):depression/anxiety    (-) seizures, TIA and CVA           GI/Hepatic/Renal: Neg GI/Hepatic/Renal ROS       (-) GERD, liver disease and no renal disease       Endo/Other: Negative Endo/Other ROS       (-) diabetes mellitus, hypothyroidism, hyperthyroidism               Abdominal:   (+) obese,           Vascular: Other Findings:           Anesthesia Plan      general     ASA 3       Induction: intravenous. MIPS: Postoperative opioids intended and Prophylactic antiemetics administered. Anesthetic plan and risks discussed with patient. Plan discussed with CRNA.                   Shawna Lowe MD   3/22/2022

## 2022-03-23 NOTE — OP NOTE
[address], [address] [place] [address]                                OPERATIVE REPORT    PATIENT NAME: Rabia Wren                    :        1985  MED REC NO:   [id number]                          ROOM:  ACCOUNT NO:   [de-identified]                           ADMIT DATE: 2022  PROVIDER:     Savanna Phillips MD    DATE OF PROCEDURE:  2022    PREOPERATIVE DIAGNOSIS:  Status post open reduction and internal  fixation of the right ankle fracture subluxation and syndesmosis. POSTOPERATIVE DIAGNOSIS:  Status post open reduction and internal  fixation of the right ankle fracture subluxation and syndesmosis. OPERATION PERFORMED:  Removal of the right ankle deep implant  (syndesmosis screw). OPERATING SURGEON:  Savanna Phillips MD    ASSISTANT:  James Poon. ANESTHESIA:  General LMA. TOURNIQUET TIME:  9 minutes. COMPLICATIONS:  None immediately apparent. BLOOD LOSS:  Minimal.    DETAILS OF PROCEDURE:  The patient was brought back to the OR and  transferred onto the operating room table. Anesthesia was performed. A  non-sterile thigh tourniquet was applied and a bump was placed on the  right hip. The entire right lower extremity was subsequently prepped  and draped in the usual sterile fashion. A full timeout was performed  with all parties in agreement. The patient did receive Ancef for  antibiotic prophylaxis. The right leg was elevated past the heart for   a few minutes and the tourniquet was then inflated to 270 mmHg. Using a mini C-arm, the location of the syndesmosis screw was identified  over the lateral ankle and a 1 cm incision was made. This was bluntly  spread directly onto the syndesmosis screw head and then the screw was  then removed without issue. The mini C-arm was used again to confirm  that the entire syndesmosis screw was removed. The syndesmosis  TightRope was left intact.   Under live fluoroscopy, the syndesmosis was  stressed and there was preserved tibiofibular overlap. Additional  supplemental fixation of the syndesmosis was not required. The small incision was thoroughly irrigated with normal saline and then  infiltrated with local anesthetic. The tourniquet was let down. Hemostasis was obtained with direct pressure. The small incision was  then closed with 3-0 nylon. It was cleaned with wet and dries and then  dressed in the usual sterile fashion. The drapes were removed. The  patient was transferred back onto the hospital stretcher where he was  extubated and then moved to the PACU in stable condition.         Shakeel Astorga MD    D: 03/22/2022 17:15:34       T: 03/23/2022 1:45:03     [email or web address]  Job#: [id number]     Doc#: [id number]    CC:

## 2022-04-06 ENCOUNTER — OFFICE VISIT (OUTPATIENT)
Dept: ORTHOPEDIC SURGERY | Age: 37
End: 2022-04-06

## 2022-04-06 VITALS — BODY MASS INDEX: 38.51 KG/M2 | RESPIRATION RATE: 16 BRPM | WEIGHT: 269 LBS | HEIGHT: 70 IN

## 2022-04-06 DIAGNOSIS — S82.891D: Primary | ICD-10-CM

## 2022-04-06 DIAGNOSIS — S93.431D ANKLE SYNDESMOSIS DISRUPTION, RIGHT, SUBSEQUENT ENCOUNTER: ICD-10-CM

## 2022-04-06 PROCEDURE — 99024 POSTOP FOLLOW-UP VISIT: CPT | Performed by: ORTHOPAEDIC SURGERY

## 2022-04-06 NOTE — LETTER
[facility]  [address]  [address] [address]  Phone: [phone]  Fax: [phone]    Kenny Mead MD        April 6, 2022     Patient: Christiana Ferguson   YOB: 1985   Date of Visit: 4/6/2022       To Whom it May Concern:    Christiana Ferguson was seen in my clinic on 4/6/2022. He may return to work on 5/19/2022. .    If you have any questions or concerns, please don't hesitate to call.     Sincerely,           Kenny Mead MD

## 2022-04-06 NOTE — PROGRESS NOTES
ORTHOPAEDIC NEW PATIENT NOTE    Chief Complaint   Patient presents with    Post-Op Check     post op right ankle        HPI   4/6/22  Postop check right ankle after syndesmosis screw removal  No issues  He is weightbearing in the boot  He reports he is doing good      3/22/22  OPERATION PERFORMED:  Removal of the right ankle deep implant (syndesmosis screw). 3/14/22  Second postop check right ankle  Reports his right ankle is doing well  He denies pain  Tolerated the cast pretty well, does complain of some itchiness however  No numbness or tingling      2/2/22  Postop check right ankle  He reports the right ankle is overall doing well  He has been maintaining nonweightbearing of his right leg  Home therapy is coming to help him  He reports the posterior splint was rubbing against his posterior thigh when he was performing knee range of motion exercises      1/24/22  OPERATION PERFORMED:  1. Open treatment of the right trimalleolar ankle fracture subluxation with internal fixation of the lateral malleolus, without fixation of the posterior lip. 2.  Open treatment of the right ankle syndesmosis disruption with internal fixation. 1/21/22  43 y.o. male seen for evaluation of right ankle fx:   Onset 1/17/2022  Injury/trauma - tripped, twisted ankle  History of symptoms - hx of heel cord surgery when he was younger   He has cerebral palsy   Right hemiplegia (UE and LE affected)   He can WB thru RLE typically and ambulates   No sensory issues, however, does not have much motor function in the right leg/ankle/foot   He works at [employer] in [place]   He resides in a group home  Pain is located right ankle diffuse  Worse with pressure, ROM, WB  Better with splint/immobilization, elevation  Associated with swelling and bruising  No DM  Does not smoke  No hx of VTE      No Known Allergies     Current Outpatient Medications   Medication Sig Dispense Refill    benztropine (COGENTIN) 1 MG tablet daily      FLUoxetine (PROZAC) 40 MG capsule daily      hydrOXYzine (VISTARIL) 25 MG capsule daily      loxapine (LOXITANE) 10 MG capsule daily      prazosin (MINIPRESS) 2 MG capsule daily Follow your MD/Surgeons pre-procedure instructions regarding your medications      QUEtiapine (SEROQUEL XR) 400 MG extended release tablet daily       No current facility-administered medications for this visit.        Past Medical History:   Diagnosis Date    [facility] ([facility]     Cerebral palsy ([facility]     Depression     Hypertension     Mental deficiency     about level of 13 yr old    Schizophrenia [facility])         Past Surgical History:   Procedure Laterality Date    ANKLE FRACTURE SURGERY Right 1/24/2022    RIGHT ANKLE FRACTURE AND SYNDESMOSIS OPEN REDUCTION INTERNAL FIXATION performed by Jennifer Rosario MD at [facility] 35 Right     heel chord extension X`s 2    FOOT SURGERY Right 3/22/2022    RIGHT ANKLE REMOVAL OF HARDWARE, performed by [facility] MD at [facility] 36      inguinal    TONSILLECTOMY         Family History   Problem Relation Age of Onset    High Blood Pressure Father     Heart Disease Father     High Cholesterol Father     Dementia Father        Social History     Socioeconomic History    Marital status: Single     Spouse name: Not on file    Number of children: Not on file    Years of education: Not on file    Highest education level: Not on file   Occupational History    Not on file   Tobacco Use    Smoking status: Never Smoker    Smokeless tobacco: Never Used   Vaping Use    Vaping Use: Never used   Substance and Sexual Activity    Alcohol use: Never    Drug use: Never    Sexual activity: Never   Other Topics Concern    Not on file   Social History Narrative    Not on file     Social Determinants of Health     Financial Resource Strain:     Difficulty of Paying Living Expenses: Not on file   Food Insecurity:     Worried About Running Out of Food in the Last Year: Not on file    Ran Out of Food in the Last Year: Not on file   Transportation Needs:     Lack of Transportation (Medical): Not on file    Lack of Transportation (Non-Medical): Not on file   Physical Activity:     Days of Exercise per Week: Not on file    Minutes of Exercise per Session: Not on file   Stress:     Feeling of Stress : Not on file   Social Connections:     Frequency of Communication with Friends and Family: Not on file    Frequency of Social Gatherings with Friends and Family: Not on file    Attends Yazdanism Services: Not on file    Active Member of [address] [place] [address] [employer] or Organizations: Not on file    Attends Club or Organization Meetings: Not on file    Marital Status: Not on file   Intimate Partner Violence:     Fear of Current or Ex-Partner: Not on file    Emotionally Abused: Not on file    Physically Abused: Not on file    Sexually Abused: Not on file   Housing Stability:     Unable to Pay for Housing in the Last Year: Not on file    Number of [place] in the Last Year: Not on file    Unstable Housing in the Last Year: Not on file        Vitals:    04/06/22 0959   Resp: 16   Weight: 269 lb (122 kg)   Height: 5' 10\" (1.778 m)       Physical Exam  Constitutional  well-groomed, well-nourished, Body mass index is 38.6 kg/m².    Here with his mother  Psychiatric  pleasant, normal mood & affect  Cardiovascular  RRR, improved peripheral edema, right dorsalis pedis pulse 2+  Respiratory  respirations unlabored, on room air; mask on  Neurological - RLE SILT SP/DP/T/sural/saphenous nerve distributions   Does not wiggle toes up and down, he reports baseline   Does not ankle dorsiflex or plantarflex, he reports baseline  Right foot/ankle - mild swelling   No ecchymosis   Xeroderma, no skin breakdown or ulcers   Lateral surgical incision well healed, no erythema or drainage, sutures removed      Imaging:  Images were personally reviewed by myself and discussed with the patient  Right ankle 3 views (weight bearing) performed 4/6/22 in clinic - status post open reduction internal fixation of the high lateral malleolus/fibula comminuted fracture. The small medial malleolar avulsion fractures are again seen. Syndesmosis fixation with Tightrope device. There is interval removal of the syndesmosis screw. Appropriate tibiofibular overlap is seen on the AP and mortise views. Mortise is intact without medial clear space widening. The posterior malleolus fracture fragment is seen on the lateral view only. There is large soft tissue calcification involving the Achilles tendon, which is unchanged from prior. Assessment & Plan:  43 y.o. male who presents with    Diagnosis Orders   1. Closed fracture dislocation of right ankle joint, with routine healing, subsequent encounter  XR ANKLE RIGHT (MIN 3 VIEWS)   2. Ankle syndesmosis disruption, right, subsequent encounter  XR ANKLE RIGHT (MIN 3 VIEWS)       No orders of the defined types were placed in this encounter.       He is doing well  He is full WB in the boot already  Okay to get surgical site wet in the shower  Can be more liberal out of the boot now  Anticipate continued boot use with activities and prolonged WB for the next 4-6 weeks as he ramps up activities  Continue PT    Tentative return to work note given today in approximately 6 weeks  I will see him back in 3 months with repeat weightbearing x-rays    Caroline Fregoso MD

## 2022-07-11 ENCOUNTER — OFFICE VISIT (OUTPATIENT)
Dept: ORTHOPEDIC SURGERY | Age: 37
End: 2022-07-11
Payer: MEDICARE

## 2022-07-11 VITALS — HEIGHT: 70 IN | BODY MASS INDEX: 38.51 KG/M2 | WEIGHT: 269 LBS

## 2022-07-11 DIAGNOSIS — S82.891D: Primary | ICD-10-CM

## 2022-07-11 DIAGNOSIS — S93.431D ANKLE SYNDESMOSIS DISRUPTION, RIGHT, SUBSEQUENT ENCOUNTER: ICD-10-CM

## 2022-07-11 PROCEDURE — G8417 CALC BMI ABV UP PARAM F/U: HCPCS | Performed by: ORTHOPAEDIC SURGERY

## 2022-07-11 PROCEDURE — 1036F TOBACCO NON-USER: CPT | Performed by: ORTHOPAEDIC SURGERY

## 2022-07-11 PROCEDURE — G8427 DOCREV CUR MEDS BY ELIG CLIN: HCPCS | Performed by: ORTHOPAEDIC SURGERY

## 2022-07-11 PROCEDURE — 99213 OFFICE O/P EST LOW 20 MIN: CPT | Performed by: ORTHOPAEDIC SURGERY

## 2022-07-11 NOTE — PROGRESS NOTES
ORTHOPAEDIC NEW PATIENT NOTE    Chief Complaint   Patient presents with    Post-Op Check     Post op right ankle       HPI   7/11/22  Reese Banda returns to clinic today for follow-up of his right ankle  He is approximately 6 months postop  He reports his right ankle is doing well  No pain  No incisional issues  He feels a little bit off balance/weak, cannot reciprocate stairs      4/6/22  Postop check right ankle after syndesmosis screw removal  No issues  He is weightbearing in the boot  He reports he is doing good      3/22/22  OPERATION PERFORMED:  Removal of the right ankle deep implant (syndesmosis screw). 3/14/22  Second postop check right ankle  Reports his right ankle is doing well  He denies pain  Tolerated the cast pretty well, does complain of some itchiness however  No numbness or tingling      2/2/22  Postop check right ankle  He reports the right ankle is overall doing well  He has been maintaining nonweightbearing of his right leg  Home therapy is coming to help him  He reports the posterior splint was rubbing against his posterior thigh when he was performing knee range of motion exercises      1/24/22  OPERATION PERFORMED:  1. Open treatment of the right trimalleolar ankle fracture subluxation with internal fixation of the lateral malleolus, without fixation of the posterior lip. 2.  Open treatment of the right ankle syndesmosis disruption with internal fixation. 1/21/22  43 y.o. male seen for evaluation of right ankle fx:   Onset 1/17/2022  Injury/trauma - tripped, twisted ankle  History of symptoms - hx of heel cord surgery when he was younger   He has cerebral palsy   Right hemiplegia (UE and LE affected)   He can WB thru RLE typically and ambulates   No sensory issues, however, does not have much motor function in the right leg/ankle/foot   He works at [employer]   He resides in a group home  Pain is located right ankle diffuse  Worse with pressure, ROM, WB  Better with splint/immobilization, elevation  Associated with swelling and bruising  No DM  Does not smoke  No hx of VTE      No Known Allergies     Current Outpatient Medications   Medication Sig Dispense Refill    benztropine (COGENTIN) 1 MG tablet daily      FLUoxetine (PROZAC) 40 MG capsule daily      hydrOXYzine (VISTARIL) 25 MG capsule daily      loxapine (LOXITANE) 10 MG capsule daily      prazosin (MINIPRESS) 2 MG capsule daily Follow your MD/Surgeons pre-procedure instructions regarding your medications      QUEtiapine (SEROQUEL XR) 400 MG extended release tablet daily       No current facility-administered medications for this visit.        Past Medical History:   Diagnosis Date    B[facility] disorder ([facility]     Cerebral palsy ([facility]     Depression     Hypertension     Mental deficiency     about level of 13 yr old    Schizophrenia [facility])         Past Surgical History:   Procedure Laterality Date    ANKLE FRACTURE SURGERY Right 1/24/2022    RIGHT ANKLE FRACTURE AND SYNDESMOSIS OPEN REDUCTION INTERNAL FIXATION performed by Justo Veras MD at [address] [place] [address] Rd Right     heel chord extension X`s 2    FOOT SURGERY Right 3/22/2022    RIGHT ANKLE REMOVAL OF HARDWARE, performed by Justo [facility]as MD at [facility] 36      inguinal    TONSILLECTOMY         Family History   Problem Relation Age of Onset    High Blood Pressure Father     Heart Disease Father     High Cholesterol Father     Dementia Father        Social History     Socioeconomic History    Marital status: Single     Spouse name: Not on file    Number of children: Not on file    Years of education: Not on file    Highest education level: Not on file   Occupational History    Not on file   Tobacco Use    Smoking status: Never Smoker    Smokeless tobacco: Never Used   Vaping Use    Vaping Use: Never used   Substance and Sexual Activity    Alcohol use: Never    Drug use: Never    Sexual activity: Never   Other Topics Concern  Not on file   Social History Narrative    Not on file     Social Determinants of Health     Financial Resource Strain:     Difficulty of Paying Living Expenses: Not on file   Food Insecurity:     Worried About Running Out of Food in the Last Year: Not on file    Bobby of Food in the Last Year: Not on file   Transportation Needs:     Lack of Transportation (Medical): Not on file    Lack of Transportation (Non-Medical): Not on file   Physical Activity:     Days of Exercise per Week: Not on file    Minutes of Exercise per Session: Not on file   Stress:     Feeling of Stress : Not on file   Social Connections:     Frequency of Communication with Friends and Family: Not on file    Frequency of Social Gatherings with Friends and Family: Not on file    Attends Temple Services: Not on file    Active Member of [address] [place] [address] [employer] or Organizations: Not on file    Attends Club or Organization Meetings: Not on file    Marital Status: Not on file   Intimate Partner Violence:     Fear of Current or Ex-Partner: Not on file    Emotionally Abused: Not on file    Physically Abused: Not on file    Sexually Abused: Not on file   Housing Stability:     Unable to Pay for Housing in the Last Year: Not on file    Number of [place] in the Last Year: Not on file    Unstable Housing in the Last Year: Not on file        Vitals:    07/11/22 1511   Weight: 269 lb (122 kg)   Height: 5' 10\" (1.778 m)       Physical Exam  Constitutional - well-groomed, well-nourished, Body mass index is 38.6 kg/m².    Here with his father  Psychiatric - pleasant, normal mood & affect  Cardiovascular - RRR, improved peripheral edema, right dorsalis pedis pulse 2+  Respiratory - respirations unlabored, on room air; mask on  Neurological - RLE SILT SP/DP/T/sural/saphenous nerve distributions   Does not wiggle toes up and down, baseline   Does not ankle dorsiflex or plantarflex, baseline  Right foot/ankle - no obvious swelling or ecchymosis   Xeroderma, no skin breakdown or ulcers   Lateral surgical incision well healed, no erythema or drainage   No TTP medially or laterally      Imaging:  Images were personally reviewed by myself and discussed with the patient  Right ankle 3 views (weight bearing) performed 7/11/22 in clinic - status post open reduction internal fixation of the high lateral malleolus/fibula comminuted fracture. The small medial malleolar avulsion fracture fragments are again seen. Syndesmosis fixation with Tightrope device. Appropriate tibiofibular overlap is seen on the AP and mortise views. There is large soft tissue calcification involving the Achilles tendon, which is unchanged from prior. The fibula and posterior malleolar fractures are healed. Assessment & Plan:  42 y.o. male who presents with    Diagnosis Orders   1. Closed fracture dislocation of right ankle joint, with routine healing, subsequent encounter  XR ANKLE RIGHT (MIN 3 VIEWS)    [facility]   2. Ankle syndesmosis disruption, right, subsequent encounter  XR ANKLE RIGHT (MIN 3 VIEWS)    [facility]       No orders of the defined types were placed in this encounter.       He is doing well  He is full WB in regular shoes already  X-rays show appropriate healing  Baseline motor weakness right side  He would like some outpatient physical therapy to improve his balance and strength, referral given  Follow-up as needed    Marcelino Ochoa MD

## 2025-06-03 ENCOUNTER — HOSPITAL ENCOUNTER (EMERGENCY)
Age: 40
Discharge: HOME | End: 2025-06-03
Attending: STUDENT IN AN ORGANIZED HEALTH CARE EDUCATION/TRAINING PROGRAM
Payer: MEDICARE

## 2025-06-03 VITALS
DIASTOLIC BLOOD PRESSURE: 84 MMHG | RESPIRATION RATE: 18 BRPM | SYSTOLIC BLOOD PRESSURE: 127 MMHG | TEMPERATURE: 97.9 F | OXYGEN SATURATION: 95 % | HEART RATE: 100 BPM

## 2025-06-03 DIAGNOSIS — T14.8XXA SKIN ABRASION: ICD-10-CM
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
## 2025-06-03 PROCEDURE — 6370000000 HC RX 637 (ALT 250 FOR IP): Performed by: STUDENT IN AN ORGANIZED HEALTH CARE EDUCATION/TRAINING PROGRAM

## 2025-06-03 PROCEDURE — 90714 TD VACC NO PRESV 7 YRS+ IM: CPT | Performed by: STUDENT IN AN ORGANIZED HEALTH CARE EDUCATION/TRAINING PROGRAM

## 2025-06-03 PROCEDURE — 99284 EMERGENCY DEPT VISIT MOD MDM: CPT

## 2025-06-03 PROCEDURE — 90471 IMMUNIZATION ADMIN: CPT | Performed by: STUDENT IN AN ORGANIZED HEALTH CARE EDUCATION/TRAINING PROGRAM

## 2025-06-03 PROCEDURE — 6360000002 HC RX W HCPCS: Performed by: STUDENT IN AN ORGANIZED HEALTH CARE EDUCATION/TRAINING PROGRAM

## 2025-06-03 RX ORDER — IBUPROFEN 800 MG/1
800 TABLET, FILM COATED ORAL ONCE
Status: COMPLETED | OUTPATIENT
Start: 2025-06-03 | End: 2025-06-03

## 2025-06-03 RX ORDER — PRAVASTATIN SODIUM 40 MG
40 TABLET ORAL NIGHTLY
COMMUNITY

## 2025-06-03 RX ORDER — LOSARTAN POTASSIUM 100 MG/1
100 TABLET ORAL DAILY
COMMUNITY
Start: 2025-05-27

## 2025-06-03 RX ADMIN — IBUPROFEN 800 MG: 800 TABLET, FILM COATED ORAL at 05:49

## 2025-06-03 RX ADMIN — CLOSTRIDIUM TETANI TOXOID ANTIGEN (FORMALDEHYDE INACTIVATED) AND CORYNEBACTERIUM DIPHTHERIAE TOXOID ANTIGEN (FORMALDEHYDE INACTIVATED) 0.5 ML: 5; 2 INJECTION, SUSPENSION INTRAMUSCULAR at 05:49

## 2025-06-03 ASSESSMENT — PAIN SCALES - GENERAL: PAINLEVEL_OUTOF10: 4

## 2025-06-03 ASSESSMENT — LIFESTYLE VARIABLES
HOW OFTEN DO YOU HAVE A DRINK CONTAINING ALCOHOL: NEVER
HOW MANY STANDARD DRINKS CONTAINING ALCOHOL DO YOU HAVE ON A TYPICAL DAY: PATIENT DOES NOT DRINK

## 2025-06-03 NOTE — ED PROVIDER NOTES
EMERGENCY DEPARTMENT PROVIDER NOTE         PATIENT IDENTIFICATION     Name:   Blade Clay  MRN:   [id number]  YOB: 1985  Date of Evaluation:   6/3/2025  Provider:   Hardik Jesus DO  PCP:   Waylon Campbell MD        CHIEF COMPLAINT       Fall        HISTORY OF PRESENT ILLNESS     Blade Clay is a(n) 39 y.o. male with past medical history as below including mental deficiency  who arrives via EMS for mechanical fall.  Patient states that just prior to arrival he was walking to work when he sustained a trip and fall forwards.  Caught himself with his hands.  Denies loss of consciousness, use of blood thinners, and head trauma.  He affirms superficial abrasions to the right forearm, right hand, and left hand.  He denies any other complaints.  Caretakers at bedside states that patient is acting at baseline mentation.  He states that he is unsure when his last tetanus vaccination was.  He states he is not take any medications for current symptoms.    I personally reviewed the following nurse documentation:  Past Medical History:   Diagnosis Date    Bipolar 1 disorder (HCC)     Cerebral palsy (HCC)     Depression     Hypertension     Mental deficiency     about level of 10 yr old    Schizophrenia ([facility])      Prior to Admission medications    Medication Sig Start Date End Date Taking? Authorizing Provider   pravastatin (PRAVACHOL) 40 MG tablet Take 1 tablet by mouth nightly   Yes Provider, Historical, MD   losartan (COZAAR) 100 MG tablet 1 tablet daily 5/27/25  Yes Provider, Historical, MD   benztropine (COGENTIN) 1 MG tablet daily 1/14/22  Yes Provider, Historical, MD   FLUoxetine (PROZAC) 40 MG capsule daily 1/14/22  Yes Provider, Historical, MD   hydrOXYzine (VISTARIL) 25 MG capsule daily 1/14/22  Yes Provider, Historical, MD   loxapine (LOXITANE) 10 MG capsule daily 1/14/22  Yes Provider, Historical, MD   prazosin (MINIPRESS) 2 MG capsule daily Follow your MD/Surgeons pre-procedure

## 2025-06-03 NOTE — ED NOTES
Rinsed wounds with sterile saline, applied neosporin, sterile rolled gauzed ans secured with coban. Instructed pt and caregiver to keep wounds clean ad cover when at work or out of the house, instructed to let the wounds air out while at home or when sleeping. Instructed pt and caregiver to watch for signs of infection including warmth and welling to sites, increased pain around wounds, and fever. Pt and caregiver verbalized understanding, no questions at this time. Pt leaving department with caregiver, alert and oriented x 4, ambulatory without difficulty, no distress noted.

## (undated) DEVICE — BANDAGE COBAN 6 IN WND 6INX5YD FOAM

## (undated) DEVICE — GAUZE,SPONGE,4"X4",8PLY,STRL,LF,10/TRAY: Brand: MEDLINE

## (undated) DEVICE — GLOVE,SURG,SENSICARE,ALOE,LF,PF,9: Brand: MEDLINE

## (undated) DEVICE — TOWEL,OR,DSP,ST,BLUE,STD,4/PK,20PK/CS: Brand: MEDLINE

## (undated) DEVICE — SUTURE ETHLN SZ 3-0 L18IN NONABSORBABLE BLK FS-1 L24MM 3/8 663H

## (undated) DEVICE — DRAPE,U/ SHT,SPLIT,PLAS,STERIL: Brand: MEDLINE

## (undated) DEVICE — COTTON UNDERCAST PADDING,CRIMPED FINISH: Brand: WEBRIL

## (undated) DEVICE — BANDAGE COMPR W6INXL12FT SMOOTH FOR LIMB EXSANG ESMARCH

## (undated) DEVICE — SOLUTION IRRIG 1000ML 0.9% SOD CHL USP POUR PLAS BTL

## (undated) DEVICE — 3M™ COBAN™ NL STERILE NON-LATEX SELF-ADHERENT WRAP, 2084S, 4 IN X 5 YD (10 CM X 4,5 M), 18 ROLLS/CASE: Brand: 3M™ COBAN™

## (undated) DEVICE — SUTURE VCRL + SZ 0 L18IN ABSRB UD L36MM CT-1 1/2 CIR VCP840D

## (undated) DEVICE — DRESSING,GAUZE,XEROFORM,CURAD,1"X8",ST: Brand: CURAD

## (undated) DEVICE — HYPODERMIC SAFETY NEEDLE: Brand: MAGELLAN

## (undated) DEVICE — APPLICATOR PREP 26ML 0.7% IOD POVACRYLEX 74% ISO ALC ST

## (undated) DEVICE — T-DRAPE,EXTREMITY,STERILE: Brand: MEDLINE

## (undated) DEVICE — BIT DRL L200MM DIA2.8MM CALIB L100MM FOR 3.5MM VA LCP PROX

## (undated) DEVICE — GLOVE SURG 9 PF CRM STRL SENSICARE PI MIC LF

## (undated) DEVICE — GOWN SIRUS NONREIN XL W/TWL: Brand: MEDLINE INDUSTRIES, INC.

## (undated) DEVICE — SUTURE NONABSORBABLE MONOFILAMENT 3-0 PS-1 18 IN BLK ETHILON 1663H

## (undated) DEVICE — MERCY FAIRFIELD TURNOVER KIT: Brand: MEDLINE INDUSTRIES, INC.

## (undated) DEVICE — GLOVE SURG SZ 65 THK91MIL LTX FREE SYN POLYISOPRENE

## (undated) DEVICE — LOWER EXTREMITY: Brand: MEDLINE INDUSTRIES, INC.

## (undated) DEVICE — SCREW BNE L14MM DIA3.5MM CORT S STL ST LOK FULL THRD
Type: IMPLANTABLE DEVICE | Site: ANKLE | Status: NON-FUNCTIONAL
Removed: 2022-01-24

## (undated) DEVICE — SPONGE GZ W4XL4IN COT 12 PLY TYP VII WVN C FLD DSGN

## (undated) DEVICE — C-ARM: Brand: UNBRANDED

## (undated) DEVICE — COVER LT HNDL BLU PLAS

## (undated) DEVICE — SUTURE VCRL + SZ 3-0 L18IN ABSRB UD SH 1/2 CIR TAPERCUT NDL VCP864D

## (undated) DEVICE — GLOVE SURG SZ 75 CRM LTX FREE POLYISOPRENE POLYMER BEAD ANTI

## (undated) DEVICE — ELECTRODE PT RET AD L9FT HI MOIST COND ADH HYDRGEL CORDED

## (undated) DEVICE — BIT DRL L110MM DIA2.5MM G QUIK CPL W/O STP REUSE

## (undated) DEVICE — Device

## (undated) DEVICE — SYRINGE MED 10ML LUERLOCK TIP W/O SFTY DISP

## (undated) DEVICE — NEEDLE HYPO 22GA L1 1/2IN PIVOTING SHLD FOR LUERLOCK SYR

## (undated) DEVICE — GLOVE SURG SZ 8 L12IN FNGR THK79MIL GRN LTX FREE

## (undated) DEVICE — SCREW BNE L14MM DIA4MM CANC S STL ST CANN NONLOCKING FULL
Type: IMPLANTABLE DEVICE | Site: ANKLE | Status: NON-FUNCTIONAL
Removed: 2022-01-24

## (undated) DEVICE — ZIMMER® STERILE DISPOSABLE TOURNIQUET CUFF WITH PLC, DUAL PORT, SINGLE BLADDER, 42 IN. (107 CM)

## (undated) DEVICE — PAD,ABDOMINAL,8"X7.5",STERILE,LF,1/PK: Brand: MEDLINE

## (undated) DEVICE — SOLUTION IV IRRIG POUR BRL 0.9% SODIUM CHL 2F7124